# Patient Record
Sex: MALE | Race: WHITE | Employment: FULL TIME | URBAN - NONMETROPOLITAN AREA
[De-identification: names, ages, dates, MRNs, and addresses within clinical notes are randomized per-mention and may not be internally consistent; named-entity substitution may affect disease eponyms.]

---

## 2018-03-08 ENCOUNTER — OFFICE VISIT (OUTPATIENT)
Dept: FAMILY MEDICINE CLINIC | Age: 33
End: 2018-03-08
Payer: MEDICARE

## 2018-03-08 VITALS
WEIGHT: 172 LBS | HEIGHT: 72 IN | DIASTOLIC BLOOD PRESSURE: 78 MMHG | SYSTOLIC BLOOD PRESSURE: 132 MMHG | BODY MASS INDEX: 23.3 KG/M2

## 2018-03-08 DIAGNOSIS — S06.0X1D CONCUSSION WITH LOSS OF CONSCIOUSNESS OF 30 MINUTES OR LESS, SUBSEQUENT ENCOUNTER: ICD-10-CM

## 2018-03-08 DIAGNOSIS — R41.840 CONCENTRATION DEFICIT: ICD-10-CM

## 2018-03-08 DIAGNOSIS — M25.519 CHRONIC SHOULDER PAIN, UNSPECIFIED LATERALITY: Primary | ICD-10-CM

## 2018-03-08 DIAGNOSIS — Z72.0 TOBACCO USE: ICD-10-CM

## 2018-03-08 DIAGNOSIS — K08.9 DENTAL DISEASE: ICD-10-CM

## 2018-03-08 DIAGNOSIS — G89.29 CHRONIC SHOULDER PAIN, UNSPECIFIED LATERALITY: Primary | ICD-10-CM

## 2018-03-08 DIAGNOSIS — Z13.31 POSITIVE DEPRESSION SCREENING: ICD-10-CM

## 2018-03-08 DIAGNOSIS — Z79.899 MEDICATION MANAGEMENT: ICD-10-CM

## 2018-03-08 PROBLEM — S06.0X1A CONCUSSION WTH LOSS OF CONSCIOUSNESS OF 30 MINUTES OR LESS: Status: ACTIVE | Noted: 2018-03-08

## 2018-03-08 PROCEDURE — G8431 POS CLIN DEPRES SCRN F/U DOC: HCPCS | Performed by: FAMILY MEDICINE

## 2018-03-08 PROCEDURE — G8484 FLU IMMUNIZE NO ADMIN: HCPCS | Performed by: FAMILY MEDICINE

## 2018-03-08 PROCEDURE — 99205 OFFICE O/P NEW HI 60 MIN: CPT | Performed by: FAMILY MEDICINE

## 2018-03-08 PROCEDURE — G8427 DOCREV CUR MEDS BY ELIG CLIN: HCPCS | Performed by: FAMILY MEDICINE

## 2018-03-08 PROCEDURE — G8420 CALC BMI NORM PARAMETERS: HCPCS | Performed by: FAMILY MEDICINE

## 2018-03-08 PROCEDURE — 4004F PT TOBACCO SCREEN RCVD TLK: CPT | Performed by: FAMILY MEDICINE

## 2018-03-08 RX ORDER — BUPRENORPHINE HYDROCHLORIDE, NALOXONE HYDROCHLORIDE 8; 2 MG/1; MG/1
FILM, SOLUBLE BUCCAL; SUBLINGUAL
COMMUNITY
Start: 2018-02-27

## 2018-03-08 RX ORDER — CEPHALEXIN 500 MG/1
500 CAPSULE ORAL 2 TIMES DAILY
Qty: 20 CAPSULE | Refills: 0 | Status: SHIPPED | OUTPATIENT
Start: 2018-03-08 | End: 2019-10-21 | Stop reason: ALTCHOICE

## 2018-03-08 RX ORDER — SERTRALINE HYDROCHLORIDE 100 MG/1
200 TABLET, FILM COATED ORAL DAILY
COMMUNITY
Start: 2018-02-09 | End: 2019-10-21

## 2018-03-08 RX ORDER — MELOXICAM 15 MG/1
15 TABLET ORAL DAILY
Qty: 30 TABLET | Refills: 0 | Status: SHIPPED | OUTPATIENT
Start: 2018-03-08 | End: 2019-10-21 | Stop reason: ALTCHOICE

## 2018-03-08 ASSESSMENT — PATIENT HEALTH QUESTIONNAIRE - PHQ9
SUM OF ALL RESPONSES TO PHQ9 QUESTIONS 1 & 2: 6
4. FEELING TIRED OR HAVING LITTLE ENERGY: 2
9. THOUGHTS THAT YOU WOULD BE BETTER OFF DEAD, OR OF HURTING YOURSELF: 3
1. LITTLE INTEREST OR PLEASURE IN DOING THINGS: 3
6. FEELING BAD ABOUT YOURSELF - OR THAT YOU ARE A FAILURE OR HAVE LET YOURSELF OR YOUR FAMILY DOWN: 3
10. IF YOU CHECKED OFF ANY PROBLEMS, HOW DIFFICULT HAVE THESE PROBLEMS MADE IT FOR YOU TO DO YOUR WORK, TAKE CARE OF THINGS AT HOME, OR GET ALONG WITH OTHER PEOPLE: 3
SUM OF ALL RESPONSES TO PHQ QUESTIONS 1-9: 25
3. TROUBLE FALLING OR STAYING ASLEEP: 2
5. POOR APPETITE OR OVEREATING: 3
8. MOVING OR SPEAKING SO SLOWLY THAT OTHER PEOPLE COULD HAVE NOTICED. OR THE OPPOSITE, BEING SO FIGETY OR RESTLESS THAT YOU HAVE BEEN MOVING AROUND A LOT MORE THAN USUAL: 3
7. TROUBLE CONCENTRATING ON THINGS, SUCH AS READING THE NEWSPAPER OR WATCHING TELEVISION: 3
2. FEELING DOWN, DEPRESSED OR HOPELESS: 3

## 2018-03-08 ASSESSMENT — ENCOUNTER SYMPTOMS: BACK PAIN: 1

## 2018-03-08 NOTE — PATIENT INSTRUCTIONS
Need to see psychiatry for likely combination medication to control depression better. Reviewed need to see neurology for concussive symptoms.   Need to see dentist for repair and abscess control
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
## 2019-04-02 ENCOUNTER — TELEPHONE (OUTPATIENT)
Dept: FAMILY MEDICINE CLINIC | Age: 34
End: 2019-04-02

## 2019-04-02 NOTE — TELEPHONE ENCOUNTER
Contacting as pt noted on Ativan and xanax. Reported has history of seizures in the past and concern he will have them again. Previous dx of Anxiety/Depression treated with [facility]. Have given vistaryl 50 mg to take 3 times daily  Calling as informational note, pt has reported seeing me only 1 time for initial review. Has appt to see psychiatry planned though noting concern of care in the interim. I recommend if they are concerned with possible seizures to get referral to neurology. May also send for inpatient detox if that is what they feel is necessary for pt to get off the benzodiazepines. May review at follow up  [address] does not have any benzos listed on this pt.

## 2019-10-21 ENCOUNTER — OFFICE VISIT (OUTPATIENT)
Dept: FAMILY MEDICINE CLINIC | Age: 34
End: 2019-10-21
Payer: MEDICARE

## 2019-10-21 VITALS
TEMPERATURE: 99.5 F | SYSTOLIC BLOOD PRESSURE: 110 MMHG | HEART RATE: 89 BPM | HEIGHT: 72 IN | WEIGHT: 168 LBS | BODY MASS INDEX: 22.75 KG/M2 | DIASTOLIC BLOOD PRESSURE: 70 MMHG | OXYGEN SATURATION: 98 %

## 2019-10-21 DIAGNOSIS — F41.9 ANXIETY: ICD-10-CM

## 2019-10-21 DIAGNOSIS — R56.9 SEIZURE (HCC): Primary | ICD-10-CM

## 2019-10-21 DIAGNOSIS — Z72.0 TOBACCO USE: ICD-10-CM

## 2019-10-21 PROCEDURE — 4004F PT TOBACCO SCREEN RCVD TLK: CPT | Performed by: FAMILY MEDICINE

## 2019-10-21 PROCEDURE — 96160 PT-FOCUSED HLTH RISK ASSMT: CPT | Performed by: FAMILY MEDICINE

## 2019-10-21 PROCEDURE — G8427 DOCREV CUR MEDS BY ELIG CLIN: HCPCS | Performed by: FAMILY MEDICINE

## 2019-10-21 PROCEDURE — G8420 CALC BMI NORM PARAMETERS: HCPCS | Performed by: FAMILY MEDICINE

## 2019-10-21 PROCEDURE — 99213 OFFICE O/P EST LOW 20 MIN: CPT | Performed by: FAMILY MEDICINE

## 2019-10-21 PROCEDURE — G8484 FLU IMMUNIZE NO ADMIN: HCPCS | Performed by: FAMILY MEDICINE

## 2019-10-21 ASSESSMENT — COLUMBIA-SUICIDE SEVERITY RATING SCALE - C-SSRS
6. HAVE YOU EVER DONE ANYTHING, STARTED TO DO ANYTHING, OR PREPARED TO DO ANYTHING TO END YOUR LIFE?: NO
2. HAVE YOU ACTUALLY HAD ANY THOUGHTS OF KILLING YOURSELF?: YES
1. WITHIN THE PAST MONTH, HAVE YOU WISHED YOU WERE DEAD OR WISHED YOU COULD GO TO SLEEP AND NOT WAKE UP?: YES
4. HAVE YOU HAD THESE THOUGHTS AND HAD SOME INTENTION OF ACTING ON THEM?: NO
5. HAVE YOU STARTED TO WORK OUT OR WORKED OUT THE DETAILS OF HOW TO KILL YOURSELF? DO YOU INTEND TO CARRY OUT THIS PLAN?: NO
3. HAVE YOU BEEN THINKING ABOUT HOW YOU MIGHT KILL YOURSELF?: NO

## 2019-10-21 ASSESSMENT — ENCOUNTER SYMPTOMS
SINUS PAIN: 0
SHORTNESS OF BREATH: 1
COUGH: 1
ABDOMINAL PAIN: 0
CHEST TIGHTNESS: 1
VOMITING: 0
NAUSEA: 1
SINUS PRESSURE: 0
SORE THROAT: 1

## 2019-10-21 ASSESSMENT — PATIENT HEALTH QUESTIONNAIRE - PHQ9
8. MOVING OR SPEAKING SO SLOWLY THAT OTHER PEOPLE COULD HAVE NOTICED. OR THE OPPOSITE, BEING SO FIGETY OR RESTLESS THAT YOU HAVE BEEN MOVING AROUND A LOT MORE THAN USUAL: 0
SUM OF ALL RESPONSES TO PHQ QUESTIONS 1-9: 14
9. THOUGHTS THAT YOU WOULD BE BETTER OFF DEAD, OR OF HURTING YOURSELF: 1
3. TROUBLE FALLING OR STAYING ASLEEP: 3
10. IF YOU CHECKED OFF ANY PROBLEMS, HOW DIFFICULT HAVE THESE PROBLEMS MADE IT FOR YOU TO DO YOUR WORK, TAKE CARE OF THINGS AT HOME, OR GET ALONG WITH OTHER PEOPLE: 1
2. FEELING DOWN, DEPRESSED OR HOPELESS: 2
1. LITTLE INTEREST OR PLEASURE IN DOING THINGS: 1
4. FEELING TIRED OR HAVING LITTLE ENERGY: 1
6. FEELING BAD ABOUT YOURSELF - OR THAT YOU ARE A FAILURE OR HAVE LET YOURSELF OR YOUR FAMILY DOWN: 2
SUM OF ALL RESPONSES TO PHQ9 QUESTIONS 1 & 2: 3
SUM OF ALL RESPONSES TO PHQ QUESTIONS 1-9: 14
5. POOR APPETITE OR OVEREATING: 3
7. TROUBLE CONCENTRATING ON THINGS, SUCH AS READING THE NEWSPAPER OR WATCHING TELEVISION: 1

## 2019-10-23 ENCOUNTER — OFFICE VISIT (OUTPATIENT)
Dept: NEUROLOGY | Age: 34
End: 2019-10-23
Payer: MEDICARE

## 2019-10-23 VITALS
SYSTOLIC BLOOD PRESSURE: 124 MMHG | BODY MASS INDEX: 22.75 KG/M2 | WEIGHT: 168 LBS | HEART RATE: 61 BPM | DIASTOLIC BLOOD PRESSURE: 82 MMHG | HEIGHT: 72 IN

## 2019-10-23 DIAGNOSIS — G40.309 GENERALIZED SEIZURE DISORDER (HCC): ICD-10-CM

## 2019-10-23 DIAGNOSIS — G40.909 RECURRENT SEIZURES (HCC): ICD-10-CM

## 2019-10-23 DIAGNOSIS — F41.9 ANXIETY: ICD-10-CM

## 2019-10-23 DIAGNOSIS — R41.89 PARTIAL SEIZURE WITH IMPAIRED CONSCIOUSNESS (HCC): Primary | ICD-10-CM

## 2019-10-23 DIAGNOSIS — Z87.828 HISTORY OF HEAD INJURY: ICD-10-CM

## 2019-10-23 DIAGNOSIS — R56.9 PARTIAL SEIZURE WITH IMPAIRED CONSCIOUSNESS (HCC): Primary | ICD-10-CM

## 2019-10-23 DIAGNOSIS — F19.11 HISTORY OF SUBSTANCE ABUSE (HCC): ICD-10-CM

## 2019-10-23 PROCEDURE — 4004F PT TOBACCO SCREEN RCVD TLK: CPT | Performed by: PSYCHIATRY & NEUROLOGY

## 2019-10-23 PROCEDURE — G8427 DOCREV CUR MEDS BY ELIG CLIN: HCPCS | Performed by: PSYCHIATRY & NEUROLOGY

## 2019-10-23 PROCEDURE — G8420 CALC BMI NORM PARAMETERS: HCPCS | Performed by: PSYCHIATRY & NEUROLOGY

## 2019-10-23 PROCEDURE — G8484 FLU IMMUNIZE NO ADMIN: HCPCS | Performed by: PSYCHIATRY & NEUROLOGY

## 2019-10-23 PROCEDURE — 99204 OFFICE O/P NEW MOD 45 MIN: CPT | Performed by: PSYCHIATRY & NEUROLOGY

## 2019-10-23 RX ORDER — TOPIRAMATE 25 MG/1
TABLET ORAL
Qty: 70 TABLET | Refills: 0 | Status: ON HOLD | OUTPATIENT
Start: 2019-10-23 | End: 2020-01-25 | Stop reason: HOSPADM

## 2019-10-31 ENCOUNTER — HOSPITAL ENCOUNTER (OUTPATIENT)
Dept: MRI IMAGING | Age: 34
Discharge: HOME | End: 2019-11-02
Payer: MEDICARE

## 2019-10-31 DIAGNOSIS — Z87.828 HISTORY OF HEAD INJURY: ICD-10-CM

## 2019-10-31 DIAGNOSIS — G40.309 GENERALIZED SEIZURE DISORDER (HCC): ICD-10-CM

## 2019-10-31 DIAGNOSIS — R56.9 PARTIAL SEIZURE WITH IMPAIRED CONSCIOUSNESS (HCC): ICD-10-CM

## 2019-10-31 DIAGNOSIS — R41.89 PARTIAL SEIZURE WITH IMPAIRED CONSCIOUSNESS (HCC): ICD-10-CM

## 2019-10-31 LAB
BUN BLDV-MCNC: 14 MG/DL (ref 6–20)
CREAT SERPL-MCNC: 0.91 MG/DL (ref 0.7–1.2)
GFR AFRICAN AMERICAN: >60 ML/MIN
GFR NON-AFRICAN AMERICAN: >60 ML/MIN
GFR SERPL CREATININE-BSD FRML MDRD: NORMAL ML/MIN/{1.73_M2}
GFR SERPL CREATININE-BSD FRML MDRD: NORMAL ML/MIN/{1.73_M2}

## 2019-10-31 PROCEDURE — A9579 GAD-BASE MR CONTRAST NOS,1ML: HCPCS | Performed by: PSYCHIATRY & NEUROLOGY

## 2019-10-31 PROCEDURE — 70553 MRI BRAIN STEM W/O & W/DYE: CPT

## 2019-10-31 PROCEDURE — 36415 COLL VENOUS BLD VENIPUNCTURE: CPT

## 2019-10-31 PROCEDURE — 82565 ASSAY OF CREATININE: CPT

## 2019-10-31 PROCEDURE — 6360000004 HC RX CONTRAST MEDICATION: Performed by: PSYCHIATRY & NEUROLOGY

## 2019-10-31 PROCEDURE — 84520 ASSAY OF UREA NITROGEN: CPT

## 2019-10-31 RX ADMIN — GADOTERIDOL 15 ML: 279.3 INJECTION, SOLUTION INTRAVENOUS at 15:48

## 2019-11-13 ENCOUNTER — OFFICE VISIT (OUTPATIENT)
Dept: NEUROLOGY | Age: 34
End: 2019-11-13
Payer: MEDICARE

## 2019-11-13 ENCOUNTER — TELEPHONE (OUTPATIENT)
Dept: NEUROLOGY | Age: 34
End: 2019-11-13

## 2019-11-13 VITALS
SYSTOLIC BLOOD PRESSURE: 136 MMHG | BODY MASS INDEX: 23.62 KG/M2 | HEIGHT: 72 IN | HEART RATE: 84 BPM | WEIGHT: 174.4 LBS | DIASTOLIC BLOOD PRESSURE: 84 MMHG

## 2019-11-13 DIAGNOSIS — G40.909 RECURRENT SEIZURES (HCC): ICD-10-CM

## 2019-11-13 DIAGNOSIS — G40.309 GENERALIZED SEIZURE DISORDER (HCC): ICD-10-CM

## 2019-11-13 DIAGNOSIS — R56.9 PARTIAL SEIZURE WITH IMPAIRED CONSCIOUSNESS (HCC): Primary | ICD-10-CM

## 2019-11-13 DIAGNOSIS — F19.11 HISTORY OF SUBSTANCE ABUSE (HCC): ICD-10-CM

## 2019-11-13 DIAGNOSIS — Z87.828 HISTORY OF HEAD INJURY: ICD-10-CM

## 2019-11-13 DIAGNOSIS — R41.89 PARTIAL SEIZURE WITH IMPAIRED CONSCIOUSNESS (HCC): Primary | ICD-10-CM

## 2019-11-13 PROCEDURE — G8484 FLU IMMUNIZE NO ADMIN: HCPCS | Performed by: PSYCHIATRY & NEUROLOGY

## 2019-11-13 PROCEDURE — 4004F PT TOBACCO SCREEN RCVD TLK: CPT | Performed by: PSYCHIATRY & NEUROLOGY

## 2019-11-13 PROCEDURE — G8427 DOCREV CUR MEDS BY ELIG CLIN: HCPCS | Performed by: PSYCHIATRY & NEUROLOGY

## 2019-11-13 PROCEDURE — 99214 OFFICE O/P EST MOD 30 MIN: CPT | Performed by: PSYCHIATRY & NEUROLOGY

## 2019-11-13 PROCEDURE — G8420 CALC BMI NORM PARAMETERS: HCPCS | Performed by: PSYCHIATRY & NEUROLOGY

## 2019-11-13 RX ORDER — LACOSAMIDE 50 MG/1
TABLET ORAL
Qty: 120 TABLET | Refills: 0 | Status: ON HOLD | OUTPATIENT
Start: 2019-11-13 | End: 2020-01-25 | Stop reason: HOSPADM

## 2019-12-13 ENCOUNTER — TELEPHONE (OUTPATIENT)
Dept: NEUROLOGY | Age: 34
End: 2019-12-13

## 2020-01-21 ENCOUNTER — HOSPITAL ENCOUNTER (INPATIENT)
Age: 35
LOS: 4 days | Discharge: HOME | DRG: 053 | End: 2020-01-25
Attending: PSYCHIATRY & NEUROLOGY | Admitting: PSYCHIATRY & NEUROLOGY
Payer: MEDICARE

## 2020-01-21 ENCOUNTER — APPOINTMENT (OUTPATIENT)
Dept: GENERAL RADIOLOGY | Age: 35
DRG: 053 | End: 2020-01-21
Attending: PSYCHIATRY & NEUROLOGY
Payer: MEDICARE

## 2020-01-21 PROBLEM — G40.901 STATUS EPILEPTICUS (HCC): Status: ACTIVE | Noted: 2020-01-21

## 2020-01-21 LAB
-: ABNORMAL
ALLEN TEST: ABNORMAL
AMORPHOUS: ABNORMAL
AMPHETAMINE SCREEN URINE: NEGATIVE
BACTERIA: ABNORMAL
BARBITURATE SCREEN URINE: NEGATIVE
BENZODIAZEPINE SCREEN, URINE: POSITIVE
BILIRUBIN URINE: NEGATIVE
BUPRENORPHINE URINE: ABNORMAL
CANNABINOID SCREEN URINE: POSITIVE
CASTS UA: ABNORMAL /LPF (ref 0–8)
COCAINE METABOLITE, URINE: NEGATIVE
COLOR: YELLOW
COMMENT UA: ABNORMAL
CRYSTALS, UA: ABNORMAL /HPF
EPITHELIAL CELLS UA: ABNORMAL /HPF (ref 0–5)
FIO2: 30
GLUCOSE URINE: NEGATIVE
KETONES, URINE: NEGATIVE
LACTIC ACID, WHOLE BLOOD: 2.2 MMOL/L (ref 0.7–2.1)
LEUKOCYTE ESTERASE, URINE: ABNORMAL
MDMA URINE: ABNORMAL
METHADONE SCREEN, URINE: NEGATIVE
METHAMPHETAMINE, URINE: ABNORMAL
MODE: ABNORMAL
MUCUS: ABNORMAL
NEGATIVE BASE EXCESS, ART: ABNORMAL (ref 0–2)
NITRITE, URINE: NEGATIVE
O2 DEVICE/FLOW/%: ABNORMAL
OPIATES, URINE: NEGATIVE
OTHER OBSERVATIONS UA: ABNORMAL
OXYCODONE SCREEN URINE: NEGATIVE
PATIENT TEMP: ABNORMAL
PH UA: 6 (ref 5–8)
PHENCYCLIDINE, URINE: NEGATIVE
POC HCO3: 26.7 MMOL/L (ref 21–28)
POC O2 SATURATION: 71 % (ref 94–98)
POC PCO2 TEMP: ABNORMAL MM HG
POC PCO2: 50.2 MM HG (ref 35–48)
POC PH TEMP: ABNORMAL
POC PH: 7.33 (ref 7.35–7.45)
POC PO2 TEMP: ABNORMAL MM HG
POC PO2: 40.3 MM HG (ref 83–108)
POSITIVE BASE EXCESS, ART: 0 (ref 0–3)
PROCALCITONIN: 0.13 NG/ML
PROPOXYPHENE, URINE: ABNORMAL
PROTEIN UA: ABNORMAL
RBC UA: ABNORMAL /HPF (ref 0–4)
RENAL EPITHELIAL, UA: ABNORMAL /HPF
SAMPLE SITE: ABNORMAL
SPECIFIC GRAVITY UA: 1.01 (ref 1–1.03)
TCO2 (CALC), ART: 28 MMOL/L (ref 22–29)
TEST INFORMATION: ABNORMAL
TOTAL CK: 144 U/L (ref 39–308)
TRICHOMONAS: ABNORMAL
TRICYCLIC ANTIDEPRESSANTS, UR: ABNORMAL
TURBIDITY: ABNORMAL
URINE HGB: ABNORMAL
UROBILINOGEN, URINE: NORMAL
WBC UA: ABNORMAL /HPF (ref 0–5)
YEAST: ABNORMAL

## 2020-01-21 PROCEDURE — 6360000002 HC RX W HCPCS

## 2020-01-21 PROCEDURE — 2500000003 HC RX 250 WO HCPCS: Performed by: STUDENT IN AN ORGANIZED HEALTH CARE EDUCATION/TRAINING PROGRAM

## 2020-01-21 PROCEDURE — 81003 URINALYSIS AUTO W/O SCOPE: CPT

## 2020-01-21 PROCEDURE — 6360000002 HC RX W HCPCS: Performed by: STUDENT IN AN ORGANIZED HEALTH CARE EDUCATION/TRAINING PROGRAM

## 2020-01-21 PROCEDURE — 94770 HC ETCO2 MONITOR DAILY: CPT

## 2020-01-21 PROCEDURE — 87086 URINE CULTURE/COLONY COUNT: CPT

## 2020-01-21 PROCEDURE — 82803 BLOOD GASES ANY COMBINATION: CPT

## 2020-01-21 PROCEDURE — 81001 URINALYSIS AUTO W/SCOPE: CPT

## 2020-01-21 PROCEDURE — 51702 INSERT TEMP BLADDER CATH: CPT

## 2020-01-21 PROCEDURE — 71045 X-RAY EXAM CHEST 1 VIEW: CPT

## 2020-01-21 PROCEDURE — 2580000003 HC RX 258: Performed by: STUDENT IN AN ORGANIZED HEALTH CARE EDUCATION/TRAINING PROGRAM

## 2020-01-21 PROCEDURE — 94002 VENT MGMT INPAT INIT DAY: CPT

## 2020-01-21 PROCEDURE — 2700000000 HC OXYGEN THERAPY PER DAY

## 2020-01-21 PROCEDURE — 36415 COLL VENOUS BLD VENIPUNCTURE: CPT

## 2020-01-21 PROCEDURE — 95708 EEG WO VID EA 12-26HR UNMNTR: CPT

## 2020-01-21 PROCEDURE — 80307 DRUG TEST PRSMV CHEM ANLYZR: CPT

## 2020-01-21 PROCEDURE — 82550 ASSAY OF CK (CPK): CPT

## 2020-01-21 PROCEDURE — 87641 MR-STAPH DNA AMP PROBE: CPT

## 2020-01-21 PROCEDURE — 2000000003 HC NEURO ICU R&B

## 2020-01-21 PROCEDURE — 83605 ASSAY OF LACTIC ACID: CPT

## 2020-01-21 PROCEDURE — 36600 WITHDRAWAL OF ARTERIAL BLOOD: CPT

## 2020-01-21 PROCEDURE — 6370000000 HC RX 637 (ALT 250 FOR IP): Performed by: STUDENT IN AN ORGANIZED HEALTH CARE EDUCATION/TRAINING PROGRAM

## 2020-01-21 PROCEDURE — 94761 N-INVAS EAR/PLS OXIMETRY MLT: CPT

## 2020-01-21 PROCEDURE — C9254 INJECTION, LACOSAMIDE: HCPCS | Performed by: STUDENT IN AN ORGANIZED HEALTH CARE EDUCATION/TRAINING PROGRAM

## 2020-01-21 PROCEDURE — 5A1935Z RESPIRATORY VENTILATION, LESS THAN 24 CONSECUTIVE HOURS: ICD-10-PCS | Performed by: STUDENT IN AN ORGANIZED HEALTH CARE EDUCATION/TRAINING PROGRAM

## 2020-01-21 PROCEDURE — 84145 PROCALCITONIN (PCT): CPT

## 2020-01-21 RX ORDER — MIDAZOLAM HYDROCHLORIDE 1 MG/ML
2 INJECTION INTRAMUSCULAR; INTRAVENOUS EVERY 4 HOURS PRN
Status: DISCONTINUED | OUTPATIENT
Start: 2020-01-21 | End: 2020-01-21

## 2020-01-21 RX ORDER — MIDAZOLAM HYDROCHLORIDE 1 MG/ML
INJECTION INTRAMUSCULAR; INTRAVENOUS
Status: COMPLETED
Start: 2020-01-21 | End: 2020-01-21

## 2020-01-21 RX ORDER — PROPOFOL 10 MG/ML
INJECTION, EMULSION INTRAVENOUS
Status: COMPLETED
Start: 2020-01-21 | End: 2020-01-21

## 2020-01-21 RX ORDER — MIDAZOLAM HYDROCHLORIDE 1 MG/ML
4 INJECTION INTRAMUSCULAR; INTRAVENOUS ONCE
Status: COMPLETED | OUTPATIENT
Start: 2020-01-21 | End: 2020-01-21

## 2020-01-21 RX ORDER — PROPOFOL 10 MG/ML
10 INJECTION, EMULSION INTRAVENOUS
Status: DISCONTINUED | OUTPATIENT
Start: 2020-01-21 | End: 2020-01-23

## 2020-01-21 RX ORDER — SODIUM CHLORIDE 0.9 % (FLUSH) 0.9 %
10 SYRINGE (ML) INJECTION EVERY 12 HOURS SCHEDULED
Status: DISCONTINUED | OUTPATIENT
Start: 2020-01-21 | End: 2020-01-25 | Stop reason: HOSPADM

## 2020-01-21 RX ORDER — CHLORHEXIDINE GLUCONATE 0.12 MG/ML
15 RINSE ORAL 2 TIMES DAILY
Status: DISCONTINUED | OUTPATIENT
Start: 2020-01-21 | End: 2020-01-22

## 2020-01-21 RX ORDER — SODIUM CHLORIDE 0.9 % (FLUSH) 0.9 %
10 SYRINGE (ML) INJECTION PRN
Status: DISCONTINUED | OUTPATIENT
Start: 2020-01-21 | End: 2020-01-25 | Stop reason: HOSPADM

## 2020-01-21 RX ORDER — FENTANYL CITRATE 50 UG/ML
50 INJECTION, SOLUTION INTRAMUSCULAR; INTRAVENOUS EVERY 4 HOURS PRN
Status: DISCONTINUED | OUTPATIENT
Start: 2020-01-21 | End: 2020-01-21

## 2020-01-21 RX ORDER — MIDAZOLAM HYDROCHLORIDE 1 MG/ML
1 INJECTION INTRAMUSCULAR; INTRAVENOUS ONCE
Status: DISCONTINUED | OUTPATIENT
Start: 2020-01-21 | End: 2020-01-21

## 2020-01-21 RX ORDER — ONDANSETRON 2 MG/ML
4 INJECTION INTRAMUSCULAR; INTRAVENOUS EVERY 6 HOURS PRN
Status: DISCONTINUED | OUTPATIENT
Start: 2020-01-21 | End: 2020-01-25 | Stop reason: HOSPADM

## 2020-01-21 RX ADMIN — MIDAZOLAM HYDROCHLORIDE 4 MG: 1 INJECTION, SOLUTION INTRAMUSCULAR; INTRAVENOUS at 22:23

## 2020-01-21 RX ADMIN — SODIUM CHLORIDE: 9 INJECTION, SOLUTION INTRAVENOUS at 19:39

## 2020-01-21 RX ADMIN — PROPOFOL 50 MCG/KG/MIN: 10 INJECTION, EMULSION INTRAVENOUS at 22:36

## 2020-01-21 RX ADMIN — DEXTROSE MONOHYDRATE 100 MG: 50 INJECTION, SOLUTION INTRAVENOUS at 22:43

## 2020-01-21 RX ADMIN — MIDAZOLAM HYDROCHLORIDE 4 MG: 1 INJECTION, SOLUTION INTRAMUSCULAR; INTRAVENOUS at 18:27

## 2020-01-21 RX ADMIN — FAMOTIDINE 20 MG: 10 INJECTION, SOLUTION INTRAVENOUS at 19:55

## 2020-01-21 RX ADMIN — Medication 15 ML: at 19:52

## 2020-01-21 RX ADMIN — MIDAZOLAM HYDROCHLORIDE 4 MG: 1 INJECTION INTRAMUSCULAR; INTRAVENOUS at 18:27

## 2020-01-21 RX ADMIN — ENOXAPARIN SODIUM 40 MG: 40 INJECTION SUBCUTANEOUS at 19:55

## 2020-01-21 RX ADMIN — DEXTROSE MONOHYDRATE 200 MG: 50 INJECTION, SOLUTION INTRAVENOUS at 19:38

## 2020-01-21 RX ADMIN — FENTANYL CITRATE 50 MCG: 50 INJECTION, SOLUTION INTRAMUSCULAR; INTRAVENOUS at 20:05

## 2020-01-21 RX ADMIN — Medication 2 MG/HR: at 22:23

## 2020-01-21 ASSESSMENT — PULMONARY FUNCTION TESTS
PIF_VALUE: 16
PIF_VALUE: 12
PIF_VALUE: 9
PIF_VALUE: 10
PIF_VALUE: 11
PIF_VALUE: 13
PIF_VALUE: 10
PIF_VALUE: 13
PIF_VALUE: 16
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
## 2020-01-22 LAB
ABSOLUTE EOS #: <0.03 K/UL (ref 0–0.44)
ABSOLUTE IMMATURE GRANULOCYTE: 0.04 K/UL (ref 0–0.3)
ABSOLUTE LYMPH #: 1.75 K/UL (ref 1.1–3.7)
ABSOLUTE MONO #: 1.47 K/UL (ref 0.1–1.2)
ALLEN TEST: ABNORMAL
ANION GAP SERPL CALCULATED.3IONS-SCNC: 13 MMOL/L (ref 9–17)
ANION GAP SERPL CALCULATED.3IONS-SCNC: 14 MMOL/L (ref 9–17)
BASOPHILS # BLD: 0 % (ref 0–2)
BASOPHILS ABSOLUTE: <0.03 K/UL (ref 0–0.2)
BUN BLDV-MCNC: 16 MG/DL (ref 6–20)
BUN BLDV-MCNC: 16 MG/DL (ref 6–20)
BUN/CREAT BLD: ABNORMAL (ref 9–20)
BUN/CREAT BLD: ABNORMAL (ref 9–20)
CALCIUM SERPL-MCNC: 8.6 MG/DL (ref 8.6–10.4)
CALCIUM SERPL-MCNC: 9.2 MG/DL (ref 8.6–10.4)
CHLORIDE BLD-SCNC: 110 MMOL/L (ref 98–107)
CHLORIDE BLD-SCNC: 112 MMOL/L (ref 98–107)
CO2: 18 MMOL/L (ref 20–31)
CO2: 18 MMOL/L (ref 20–31)
CREAT SERPL-MCNC: 1.95 MG/DL (ref 0.7–1.2)
CREAT SERPL-MCNC: 2.49 MG/DL (ref 0.7–1.2)
CULTURE: NO GROWTH
DIFFERENTIAL TYPE: ABNORMAL
EOSINOPHILS RELATIVE PERCENT: 0 % (ref 1–4)
FIO2: 30
GFR AFRICAN AMERICAN: 36 ML/MIN
GFR AFRICAN AMERICAN: 48 ML/MIN
GFR NON-AFRICAN AMERICAN: 30 ML/MIN
GFR NON-AFRICAN AMERICAN: 40 ML/MIN
GFR SERPL CREATININE-BSD FRML MDRD: ABNORMAL ML/MIN/{1.73_M2}
GLUCOSE BLD-MCNC: 87 MG/DL (ref 70–99)
GLUCOSE BLD-MCNC: 90 MG/DL (ref 74–100)
GLUCOSE BLD-MCNC: 92 MG/DL (ref 70–99)
HCT VFR BLD CALC: 43 % (ref 40.7–50.3)
HEMOGLOBIN: 12.9 G/DL (ref 13–17)
IMMATURE GRANULOCYTES: 0 %
LACTIC ACID, WHOLE BLOOD: 0.8 MMOL/L (ref 0.7–2.1)
LYMPHOCYTES # BLD: 19 % (ref 24–43)
Lab: NORMAL
MAGNESIUM: 2.5 MG/DL (ref 1.6–2.6)
MCH RBC QN AUTO: 29.1 PG (ref 25.2–33.5)
MCHC RBC AUTO-ENTMCNC: 30 G/DL (ref 28.4–34.8)
MCV RBC AUTO: 96.8 FL (ref 82.6–102.9)
MODE: ABNORMAL
MONOCYTES # BLD: 16 % (ref 3–12)
MRSA, DNA, NASAL: NORMAL
MYOGLOBIN: 91 NG/ML (ref 28–72)
NEGATIVE BASE EXCESS, ART: 1 (ref 0–2)
NRBC AUTOMATED: 0 PER 100 WBC
O2 DEVICE/FLOW/%: ABNORMAL
PATIENT TEMP: ABNORMAL
PDW BLD-RTO: 13.7 % (ref 11.8–14.4)
PLATELET # BLD: 141 K/UL (ref 138–453)
PLATELET ESTIMATE: ABNORMAL
PMV BLD AUTO: 11.3 FL (ref 8.1–13.5)
POC HCO3: 24.8 MMOL/L (ref 21–28)
POC O2 SATURATION: 94 % (ref 94–98)
POC PCO2 TEMP: ABNORMAL MM HG
POC PCO2: 42.7 MM HG (ref 35–48)
POC PH TEMP: ABNORMAL
POC PH: 7.37 (ref 7.35–7.45)
POC PO2 TEMP: ABNORMAL MM HG
POC PO2: 74.6 MM HG (ref 83–108)
POSITIVE BASE EXCESS, ART: ABNORMAL (ref 0–3)
POTASSIUM SERPL-SCNC: 3.8 MMOL/L (ref 3.7–5.3)
POTASSIUM SERPL-SCNC: 4.1 MMOL/L (ref 3.7–5.3)
RBC # BLD: 4.44 M/UL (ref 4.21–5.77)
RBC # BLD: ABNORMAL 10*6/UL
SAMPLE SITE: ABNORMAL
SEG NEUTROPHILS: 65 % (ref 36–65)
SEGMENTED NEUTROPHILS ABSOLUTE COUNT: 6.18 K/UL (ref 1.5–8.1)
SODIUM BLD-SCNC: 141 MMOL/L (ref 135–144)
SODIUM BLD-SCNC: 144 MMOL/L (ref 135–144)
SPECIMEN DESCRIPTION: NORMAL
SPECIMEN DESCRIPTION: NORMAL
TCO2 (CALC), ART: 26 MMOL/L (ref 22–29)
TOTAL CK: 168 U/L (ref 39–308)
URIC ACID: 9.9 MG/DL (ref 3.4–7)
WBC # BLD: 9.5 K/UL (ref 3.5–11.3)
WBC # BLD: ABNORMAL 10*3/UL

## 2020-01-22 PROCEDURE — C9254 INJECTION, LACOSAMIDE: HCPCS | Performed by: STUDENT IN AN ORGANIZED HEALTH CARE EDUCATION/TRAINING PROGRAM

## 2020-01-22 PROCEDURE — 80048 BASIC METABOLIC PNL TOTAL CA: CPT

## 2020-01-22 PROCEDURE — 6360000002 HC RX W HCPCS

## 2020-01-22 PROCEDURE — 36600 WITHDRAWAL OF ARTERIAL BLOOD: CPT

## 2020-01-22 PROCEDURE — 84550 ASSAY OF BLOOD/URIC ACID: CPT

## 2020-01-22 PROCEDURE — 83874 ASSAY OF MYOGLOBIN: CPT

## 2020-01-22 PROCEDURE — 82550 ASSAY OF CK (CPK): CPT

## 2020-01-22 PROCEDURE — 82803 BLOOD GASES ANY COMBINATION: CPT

## 2020-01-22 PROCEDURE — 2580000003 HC RX 258: Performed by: STUDENT IN AN ORGANIZED HEALTH CARE EDUCATION/TRAINING PROGRAM

## 2020-01-22 PROCEDURE — 76937 US GUIDE VASCULAR ACCESS: CPT

## 2020-01-22 PROCEDURE — 99291 CRITICAL CARE FIRST HOUR: CPT | Performed by: PSYCHIATRY & NEUROLOGY

## 2020-01-22 PROCEDURE — 2500000003 HC RX 250 WO HCPCS: Performed by: STUDENT IN AN ORGANIZED HEALTH CARE EDUCATION/TRAINING PROGRAM

## 2020-01-22 PROCEDURE — 2700000000 HC OXYGEN THERAPY PER DAY

## 2020-01-22 PROCEDURE — 94003 VENT MGMT INPAT SUBQ DAY: CPT

## 2020-01-22 PROCEDURE — 95720 EEG PHY/QHP EA INCR W/VEEG: CPT | Performed by: PSYCHIATRY & NEUROLOGY

## 2020-01-22 PROCEDURE — 85025 COMPLETE CBC W/AUTO DIFF WBC: CPT

## 2020-01-22 PROCEDURE — 6370000000 HC RX 637 (ALT 250 FOR IP): Performed by: NURSE PRACTITIONER

## 2020-01-22 PROCEDURE — 95714 VEEG EA 12-26 HR UNMNTR: CPT

## 2020-01-22 PROCEDURE — 83735 ASSAY OF MAGNESIUM: CPT

## 2020-01-22 PROCEDURE — 6360000002 HC RX W HCPCS: Performed by: STUDENT IN AN ORGANIZED HEALTH CARE EDUCATION/TRAINING PROGRAM

## 2020-01-22 PROCEDURE — C1751 CATH, INF, PER/CENT/MIDLINE: HCPCS

## 2020-01-22 PROCEDURE — 36415 COLL VENOUS BLD VENIPUNCTURE: CPT

## 2020-01-22 PROCEDURE — 82947 ASSAY GLUCOSE BLOOD QUANT: CPT

## 2020-01-22 PROCEDURE — 83605 ASSAY OF LACTIC ACID: CPT

## 2020-01-22 PROCEDURE — 2000000003 HC NEURO ICU R&B

## 2020-01-22 RX ORDER — SENNA AND DOCUSATE SODIUM 50; 8.6 MG/1; MG/1
2 TABLET, FILM COATED ORAL DAILY
Status: DISCONTINUED | OUTPATIENT
Start: 2020-01-22 | End: 2020-01-25 | Stop reason: HOSPADM

## 2020-01-22 RX ORDER — FENTANYL CITRATE 50 UG/ML
50 INJECTION, SOLUTION INTRAMUSCULAR; INTRAVENOUS
Status: DISCONTINUED | OUTPATIENT
Start: 2020-01-22 | End: 2020-01-25 | Stop reason: HOSPADM

## 2020-01-22 RX ORDER — FENTANYL CITRATE 50 UG/ML
INJECTION, SOLUTION INTRAMUSCULAR; INTRAVENOUS
Status: COMPLETED
Start: 2020-01-22 | End: 2020-01-22

## 2020-01-22 RX ORDER — SODIUM CHLORIDE, SODIUM LACTATE, POTASSIUM CHLORIDE, AND CALCIUM CHLORIDE .6; .31; .03; .02 G/100ML; G/100ML; G/100ML; G/100ML
1000 INJECTION, SOLUTION INTRAVENOUS ONCE
Status: COMPLETED | OUTPATIENT
Start: 2020-01-22 | End: 2020-01-22

## 2020-01-22 RX ORDER — FENTANYL CITRATE 50 UG/ML
100 INJECTION, SOLUTION INTRAMUSCULAR; INTRAVENOUS ONCE
Status: COMPLETED | OUTPATIENT
Start: 2020-01-22 | End: 2020-01-22

## 2020-01-22 RX ORDER — SODIUM CHLORIDE, SODIUM LACTATE, POTASSIUM CHLORIDE, AND CALCIUM CHLORIDE .6; .31; .03; .02 G/100ML; G/100ML; G/100ML; G/100ML
500 INJECTION, SOLUTION INTRAVENOUS ONCE
Status: COMPLETED | OUTPATIENT
Start: 2020-01-22 | End: 2020-01-22

## 2020-01-22 RX ORDER — FENTANYL CITRATE 50 UG/ML
25 INJECTION, SOLUTION INTRAMUSCULAR; INTRAVENOUS
Status: DISCONTINUED | OUTPATIENT
Start: 2020-01-22 | End: 2020-01-25 | Stop reason: HOSPADM

## 2020-01-22 RX ORDER — SODIUM CHLORIDE, SODIUM LACTATE, POTASSIUM CHLORIDE, CALCIUM CHLORIDE 600; 310; 30; 20 MG/100ML; MG/100ML; MG/100ML; MG/100ML
INJECTION, SOLUTION INTRAVENOUS CONTINUOUS
Status: DISCONTINUED | OUTPATIENT
Start: 2020-01-22 | End: 2020-01-25

## 2020-01-22 RX ADMIN — FAMOTIDINE 20 MG: 10 INJECTION, SOLUTION INTRAVENOUS at 22:55

## 2020-01-22 RX ADMIN — SODIUM CHLORIDE, POTASSIUM CHLORIDE, SODIUM LACTATE AND CALCIUM CHLORIDE 1000 ML: 600; 310; 30; 20 INJECTION, SOLUTION INTRAVENOUS at 10:30

## 2020-01-22 RX ADMIN — FENTANYL CITRATE 100 MCG: 50 INJECTION, SOLUTION INTRAMUSCULAR; INTRAVENOUS at 01:08

## 2020-01-22 RX ADMIN — DEXTROSE MONOHYDRATE 100 MG: 50 INJECTION, SOLUTION INTRAVENOUS at 21:58

## 2020-01-22 RX ADMIN — FAMOTIDINE 20 MG: 10 INJECTION, SOLUTION INTRAVENOUS at 08:31

## 2020-01-22 RX ADMIN — DEXTROSE MONOHYDRATE 100 MG: 50 INJECTION, SOLUTION INTRAVENOUS at 08:31

## 2020-01-22 RX ADMIN — PROPOFOL 45 MCG/KG/MIN: 10 INJECTION, EMULSION INTRAVENOUS at 14:06

## 2020-01-22 RX ADMIN — SODIUM CHLORIDE, POTASSIUM CHLORIDE, SODIUM LACTATE AND CALCIUM CHLORIDE: 600; 310; 30; 20 INJECTION, SOLUTION INTRAVENOUS at 07:33

## 2020-01-22 RX ADMIN — DEXMEDETOMIDINE HYDROCHLORIDE 0.4 MCG/KG/HR: 100 INJECTION, SOLUTION INTRAVENOUS at 14:06

## 2020-01-22 RX ADMIN — PROPOFOL 50 MCG/KG/MIN: 10 INJECTION, EMULSION INTRAVENOUS at 09:05

## 2020-01-22 RX ADMIN — DOCUSATE SODIUM AND SENNOSIDES 2 TABLET: 50; 8.6 TABLET, FILM COATED ORAL at 10:30

## 2020-01-22 RX ADMIN — Medication 10 ML: at 08:32

## 2020-01-22 RX ADMIN — PROPOFOL 50 MCG/KG/MIN: 10 INJECTION, EMULSION INTRAVENOUS at 01:33

## 2020-01-22 RX ADMIN — SODIUM CHLORIDE, POTASSIUM CHLORIDE, SODIUM LACTATE AND CALCIUM CHLORIDE 500 ML: 600; 310; 30; 20 INJECTION, SOLUTION INTRAVENOUS at 09:02

## 2020-01-22 RX ADMIN — PROPOFOL 50 MCG/KG/MIN: 10 INJECTION, EMULSION INTRAVENOUS at 04:40

## 2020-01-22 RX ADMIN — SODIUM CHLORIDE, POTASSIUM CHLORIDE, SODIUM LACTATE AND CALCIUM CHLORIDE 1000 ML: 600; 310; 30; 20 INJECTION, SOLUTION INTRAVENOUS at 22:55

## 2020-01-22 RX ADMIN — ENOXAPARIN SODIUM 40 MG: 40 INJECTION SUBCUTANEOUS at 08:31

## 2020-01-22 ASSESSMENT — PULMONARY FUNCTION TESTS
PIF_VALUE: 13
PIF_VALUE: 16
PIF_VALUE: 13
PIF_VALUE: 17
PIF_VALUE: 16
PIF_VALUE: 19
PIF_VALUE: 17
PIF_VALUE: 16
PIF_VALUE: 13
PIF_VALUE: 17
PIF_VALUE: 17

## 2020-01-22 NOTE — PROGRESS NOTES
Patient has become much more arousable and awake during weaning of sedation and addition of precedex. Attempts to get out of bed and remove tube, but re-directs well. Not as agitated as earlier Will wean off ventilator soon.

## 2020-01-22 NOTE — PLAN OF CARE
DATE: 01/22/20       AWAKE & FOLLOWING COMMANDS:  [x] No   [] Yes     INTUBATED:   [] No   [x] Yes, SBT later today as weaning of drips progress for possible extubation. SEDATION/ANALGESIA:    [x] Propofol gtt  [] Precedex gtt [x] Versed gtt   [] No Sedation or Not Applicable  Pain medications: versed and prop gtt, fentanyl, will wean propofol and increase versed PRN.      VASOPRESSORS:  [x] No    [] Yes  [] Levophed [] Dopamine [] Vasopressin  [] Dobutamine [] Phenylephrine [] Epinephrine    CENTRAL LINES:  [x] No    [] Yes:  Location: , Date placed: , Indication:     NASH CATHETER: [] No    [x] Yes:  Location: Urethral, Date placed: 1/21/20, Indication:     FEEDING: Able to take PO?  [] No:  [] NG/OG [] PEG  [x] NPO for:   [] Tube Feeds    [] Yes:  Diet:     DVT Prophylaxis:  [x] Lovenox   [] Heparin subQ   [] Anticoagulation   [] Contraindicated secondary to:     Stress Ulcer Prophylaxis:  [] PPI  [x] H2 Receptor Blocker  [] Not indicated    Blood Glucose:  [x] Blood glucose <180 consistently  [] Insulin sliding scale   [x] Home Medications addressed     HOB >30 Degrees:  [x] Yes  [] No, contraindicated due to     Herb Pedersen MD, [facility]  PGY-2 Neurology   1/22/2020 at 9:23 AM

## 2020-01-22 NOTE — PROGRESS NOTES
Patient awakens through sedation occasionally, attempts to remove restraints and sits up in bed. Patient re-direction works well. Patient still on ventilator, restrained.

## 2020-01-22 NOTE — PROGRESS NOTES
Daily Progress Note  Neuro Critical Care    Patient Name: Ryland Boogie  Patient : 1985  Room/Bed: 5250/4042-17  Allergies: Allergies   Allergen Reactions    Flexeril [Cyclobenzaprine] Other (See Comments)     Restless legs, very axious    Amoxicillin Nausea And Vomiting    Penicillins Nausea And Vomiting     Problem List:   Patient Active Problem List   Diagnosis    Chronic shoulder pain    Tobacco use    Concussion wth loss of consciousness of 30 minutes or less    Dental disease    Anxiety    Partial seizure with impaired consciousness ([address]    Generalized seizure disorder ([address]    History of head injury    Status epilepticus ([address]       INTERVAL HISTORY    Initial Presentation   The patient is a 36 y.o. male transfer from [facility] presents for seizure. Patient is status post remote TBI, has had seizure since then. His neurologist has taken him off Relux 2 weeks ago due to suicidal ideation. Patient has been taking Xanax. There was concern as patient had a straw of some sort with dried powder in it, concerning for drugs. Patient has a history of drug addiction. Per report patient had fallen, was placed in c-collar, was intubated as he remained in status epilepticus for greater than 15 minutes. Last 24 hours:  Maintained on sedation with propofol and versed overnight. Some episodes of agitation while on sedation. No clinical or electrographic seizures were recorded.  Will wean off propofol and plan for SBT trial and possible extubation     MEDICATIONS:     CURRENT MEDICATIONS:  SCHEDULED MEDICATIONS:   famotidine (PEPCID) injection  20 mg Intravenous BID    lactated ringers bolus  500 mL Intravenous Once    sodium chloride flush  10 mL Intravenous 2 times per day    enoxaparin  40 mg Subcutaneous Daily    lacosamide (VIMPAT) IVPB  100 mg Intravenous BID     CONTINUOUS INFUSIONS:   lactated ringers 100 mL/hr at 20 4791    propofol 50 mcg/kg/min (20 0905)  midazolam 2 mg/hr (20)     PRN MEDICATIONS:   sodium chloride flush, magnesium hydroxide, ondansetron    VITALS 24 Hours     Temperature Range: Temp: 97.9 °F (36.6 °C) Temp  Av.8 °F (36.6 °C)  Min: 97.3 °F (36.3 °C)  Max: 98 °F (36.7 °C)  BP Range: Systolic (67WFW), GCN:674 , Min:108 , PCO:009     Diastolic (64KHR), ADQ:49, Min:65, Max:84    Pulse Range: Pulse  Av.3  Min: 68  Max: 78  Respiration Range: Resp  Av.6  Min: 14  Max: 18  Current Pulse Ox: SpO2: 99 %  24HR Pulse Ox Range: SpO2  Av.4 %  Min: 97 %  Max: 100 %  Patient Vitals for the past 12 hrs:   BP Temp Temp src Pulse Resp SpO2   20 0738 -- -- -- 68 16 99 %   20 0737 -- -- -- -- 15 100 %   20 0700 111/66 -- -- 69 17 99 %   20 0600 111/67 -- -- 71 17 100 %   20 0500 115/69 -- -- 75 18 99 %   20 0400 118/65 97.9 °F (36.6 °C) Oral 76 16 98 %   20 0300 120/74 -- -- 78 16 98 %   20 0200 116/71 -- -- 78 17 98 %   20 0000 123/75 98 °F (36.7 °C) Oral 74 17 100 %   20 2332 -- -- -- 74 17 100 %   20 2300 113/67 -- -- 76 17 97 %   20 2200 115/69 -- -- 75 16 100 %     Estimated body mass index is 21.89 kg/m² as calculated from the following:    Height as of this encounter: 6' (1.829 m). Weight as of this encounter: 161 lb 6 oz (73.2 kg). PHYSICAL EXAM     Physical Exam  Constitutional:       Appearance: He is normal weight. Interventions: He is sedated and intubated. Cervical collar in place. HENT:      Head: Normocephalic and atraumatic. Eyes:      General: Lids are normal.      Conjunctiva/sclera:      Right eye: Right conjunctiva is not injected. No chemosis, exudate or hemorrhage. Left eye: Left conjunctiva is not injected. No chemosis, exudate or hemorrhage. Pupils: Pupils are equal, round, and reactive to light. Cardiovascular:      Rate and Rhythm: Normal rate and regular rhythm. Pulses: Normal pulses.    Pulmonary: drug(s) and/or metabolite(s) may indicate diluted or adulterated urine, limitations of testing or timing of collection. Microscopic Urinalysis    Collection Time: 01/21/20  7:03 PM   Result Value Ref Range    -          WBC, UA 2 TO 5 0 - 5 /HPF    RBC, UA 2 TO 5 0 - 4 /HPF    Casts UA  0 - 8 /LPF     None Reference range defined for non-centrifuged specimen. Crystals UA MODERATE URIC ACID (A) None /HPF    Epithelial Cells UA 0 TO 2 0 - 5 /HPF    Renal Epithelial, Urine NOT REPORTED 0 /HPF    Bacteria, UA NOT REPORTED None    Mucus, UA NOT REPORTED None    Trichomonas, UA NOT REPORTED None    Amorphous, UA NOT REPORTED None    Other Observations UA NOT REPORTED NOT REQ.     Yeast, UA NOT REPORTED None   Lactic Acid, Whole Blood    Collection Time: 01/21/20  7:40 PM   Result Value Ref Range    Lactic Acid, Whole Blood 2.2 (H) 0.7 - 2.1 mmol/L   CBC auto differential    Collection Time: 01/22/20  4:36 AM   Result Value Ref Range    WBC 9.5 3.5 - 11.3 k/uL    RBC 4.44 4.21 - 5.77 m/uL    Hemoglobin 12.9 (L) 13.0 - 17.0 g/dL    Hematocrit 43.0 40.7 - 50.3 %    MCV 96.8 82.6 - 102.9 fL    MCH 29.1 25.2 - 33.5 pg    MCHC 30.0 28.4 - 34.8 g/dL    RDW 13.7 11.8 - 14.4 %    Platelets 414 518 - 654 k/uL    MPV 11.3 8.1 - 13.5 fL    NRBC Automated 0.0 0.0 per 100 WBC    Differential Type NOT REPORTED     Seg Neutrophils 65 36 - 65 %    Lymphocytes 19 (L) 24 - 43 %    Monocytes 16 (H) 3 - 12 %    Eosinophils % 0 (L) 1 - 4 %    Basophils 0 0 - 2 %    Immature Granulocytes 0 0 %    Segs Absolute 6.18 1.50 - 8.10 k/uL    Absolute Lymph # 1.75 1.10 - 3.70 k/uL    Absolute Mono # 1.47 (H) 0.10 - 1.20 k/uL    Absolute Eos # <0.03 0.00 - 0.44 k/uL    Basophils Absolute <0.03 0.00 - 0.20 k/uL    Absolute Immature Granulocyte 0.04 0.00 - 0.30 k/uL    WBC Morphology NOT REPORTED     RBC Morphology NOT REPORTED     Platelet Estimate NOT REPORTED    BASIC METABOLIC PANEL    Collection Time: 01/22/20  4:36 AM   Result Value Ref Range Glucose 92 70 - 99 mg/dL    BUN 16 6 - 20 mg/dL    CREATININE 1.95 (H) 0.70 - 1.20 mg/dL    Bun/Cre Ratio NOT REPORTED 9 - 20    Calcium 8.6 8.6 - 10.4 mg/dL    Sodium 141 135 - 144 mmol/L    Potassium 3.8 3.7 - 5.3 mmol/L    Chloride 110 (H) 98 - 107 mmol/L    CO2 18 (L) 20 - 31 mmol/L    Anion Gap 13 9 - 17 mmol/L    GFR Non-African American 40 (L) >60 mL/min    GFR  48 (L) >60 mL/min    GFR Comment          GFR Staging NOT REPORTED    Magnesium    Collection Time: 01/22/20  4:36 AM   Result Value Ref Range    Magnesium 2.5 1.6 - 2.6 mg/dL   Myoglobin, Serum    Collection Time: 01/22/20  4:36 AM   Result Value Ref Range    Myoglobin 91 (H) 28 - 72 ng/mL   Arterial Blood Gas, POC    Collection Time: 01/22/20  4:48 AM   Result Value Ref Range    POC pH 7.372 7.350 - 7.450    POC pCO2 42.7 35.0 - 48.0 mm Hg    POC PO2 74.6 (L) 83.0 - 108.0 mm Hg    POC HCO3 24.8 21.0 - 28.0 mmol/L    TCO2 (calc), Art 26 22.0 - 29.0 mmol/L    Negative Base Excess, Art 1 0.0 - 2.0    Positive Base Excess, Art NOT REPORTED 0.0 - 3.0    POC O2 SAT 94 94.0 - 98.0 %    O2 Device/Flow/% Adult Ventilator     Len Test NOT REPORTED     Sample Site Arterial Line     Mode PRVC     FIO2 30.0     Pt Temp NOT REPORTED     POC pH Temp NOT REPORTED     POC pCO2 Temp NOT REPORTED mm Hg    POC pO2 Temp NOT REPORTED mm Hg   POCT Glucose    Collection Time: 01/22/20  4:48 AM   Result Value Ref Range    POC Glucose 90 74 - 100 mg/dL   LACTIC ACID, WHOLE BLOOD    Collection Time: 01/22/20  8:13 AM   Result Value Ref Range    Lactic Acid, Whole Blood 0.8 0.7 - 2.1 mmol/L           RADIOLOGY   Xr Chest Portable    Result Date: 1/21/2020  EXAMINATION: ONE XRAY VIEW OF THE CHEST 1/21/2020 8:50 pm COMPARISON: January 21, 2020 HISTORY: ORDERING SYSTEM PROVIDED HISTORY: OG tube placement/ET tube placement verification TECHNOLOGIST PROVIDED HISTORY: OG tube placement/ET tube placement verification Reason for Exam: upright FINDINGS: ET tube 54 mm above the jennifer. Enteric tube is in the stomach. The lungs are without acute focal process. There is no effusion or pneumothorax. The cardiomediastinal silhouette is without acute process. The osseous structures are without acute process. No acute process. Satisfactory repositioning of the endotracheal and enteric tubes. Xr Chest Portable    Result Date: 1/21/2020  EXAMINATION: ONE XRAY VIEW OF THE CHEST 1/21/2020 6:40 pm COMPARISON: January 21, 2020 HISTORY: ORDERING SYSTEM PROVIDED HISTORY: seizure TECHNOLOGIST PROVIDED HISTORY: seizure Reason for Exam: upright FINDINGS: ET tube appears higher terminating 10 cm above the jennifer. New enteric tube is doubled back in the mid esophagus. Heart and mediastinum normal.  Lungs clear. Bony thorax intact. Malpositioned tubes as above. ET tube should be advanced 5-6 cm. Enteric tube should be removed and repositioned. RECOMMENDATION: The findings were sent to the Radiology Results [address] at 7:05 pm on 1/21/2020to be communicated to a licensed caregiver. Labs and Images reviewed with:  [x] Emiliana Blake    ASSESSMENT AND PLAN:       Status epilepticus, resolved   Acute kidney injury  Rhabdomiolysis   Metabolic acidosis   Lactic acidosis, resolved     NEURO  Continue vimpat 100 mg BID  Wean down propofol   Continue Versed  Start Precdex / Fentanyl    Continue LTME     CARDIOVASCULAR:    Vitals:    01/22/20 0600 01/22/20 0700 01/22/20 0737 01/22/20 0738   BP: 111/67 111/66     Pulse: 71 69  68   Resp: 17 17 15 16   Temp:       TempSrc:       SpO2: 100% 99% 100% 99%   Weight:       Height:       EKG: Normal sinus rhythm   Stable vital signs      PULM  PRVC 520, 12, 5, 30%  7.37, 42.4, 74, 18  Chest xray is clear  SBT trial and possible extubation     RENAL  Rhabdomyolysis   1.5 L of LR this morning followed by 150 cc/hour maintenance   Repeat CK isoenzymes and myoglobin    I/O last 3 completed shifts:   In: 8172 [I.V.:1403]  Out: 650 [Urine:650]  No intake/output data recorded. Intake/Output Summary (Last 24 hours) at 1/22/2020 0914  Last data filed at 1/22/2020 0700  Gross per 24 hour   Intake 1403 ml   Output 650 ml   Net 753 ml         INFECTIOUS DISEASE:  # No temperature spikes over the last 24 hours    GI/FEN/Nutrition:      Nutrition: Feeding: Other (Comment)(NPO)  Nutrition  Feeding: Other (Comment)(NPO):  GI Prophylaxis: Pepcid 20 mg IV BID    HEME    # H/H stable, no need for transfusion   #Hb trend was reviewed.  No concern for blood loss     ENDO   # Serum glucose is well controlled, no history of DM    Pain:      Lines, Drains, Airways   Midline, right cephalic   García's catheter   NG tube  ETT   2 peripheral IVs     PPX  # Pepcid 20 mg twice daily for GI prophylaxis   Lovenox 40 mg daily for DVT PPx     CODE  # Full code    Disposition:   Remain in the ICU      Man Cano MD  Neurology Resident  Neuro Critical Care   Phone: [address]

## 2020-01-22 NOTE — PLAN OF CARE
Problem: Restraint Use - Nonviolent/Non-Self-Destructive Behavior:  Goal: Absence of restraint-related injury  Description  Absence of restraint-related injury  1/22/2020 0429 by Merline Albee, RN  Outcome: Ongoing     Problem: Falls - Risk of:  Goal: Will remain free from falls  Description  Will remain free from falls  1/22/2020 0429 by Merline Albee, RN  Outcome: Ongoing     Problem: Skin Integrity:  Goal: Absence of new skin breakdown  Description  Absence of new skin breakdown  1/22/2020 0429 by Merline Albee, RN  Outcome: Ongoing

## 2020-01-22 NOTE — PROGRESS NOTES
Patient admitted on Mechanical Ventilator Protocol. Patients height measured 72\"  at  for an IBW 77.6    Patient placed on the ventilator on settings as charted on flowsheeet. Ventilator Bronchodilator assessment    Breath sounds: clear bilateral  Inspiratory Pressure: 14  Plateau Pressure: 12    Patient assessed at level 1      [x]    Bronchodilator Assessment    BRONCHODILATOR ASSESSMENT SCORE  Score 0 (Home) 1 2 3 4   Breath Sounds   []  Chronic Ventilator: Patient at baseline [x]  Mild Wheezes/ Clear []  Intermittent wheezes with good air entry []  Bilateral/unilateral wheezing with diminished air entry []  Insp/Exp wheeze and/or poor aeration   Ventilator Pressures   []  Chronic Ventilator [x]  Insp. Pressure less than 25 cm H20 []  Insp. Pressure less than 25 cm H20 []  Insp. Pressure exceeds 25 cm H20 []  Insp.  Pressure exceeds 30 cm H20   Plateau Pressure []  NA   [x]  Plateau Pressure less than 4  []  Plateau Pressure less than or equal to 5 []  Plateau Pressure greater than or equal to 6 []  Plateau Pressure greater than or equal to 8       Pooja Meyer  10:40 PM

## 2020-01-22 NOTE — PROGRESS NOTES
Patient alert and awake, assessed by Chela Agrawal NP. Extubated by RT with RN and NP present. Saturating well on the monitor, extubated to 2L via NC. Patient able to talk post-extubation. Restraints removed. Patient calm.

## 2020-01-22 NOTE — PLAN OF CARE
Problem: Restraint Use - Nonviolent/Non-Self-Destructive Behavior:  Goal: Absence of restraint-related injury  Description  Absence of restraint-related injury  Outcome: Ongoing  Patient's rights, safety, dignity and comfort maintained this shift. Continued need for restraints. Problem: Falls - Risk of:  Goal: Will remain free from falls  Description  Will remain free from falls  Outcome: Ongoing  Patient assessed for fall risk and fall precautions initiated as needed. Family instructed about safety devices and allowed to make decisions related to safey. Environment kept free of clutter and adequate lighting provided. Bed in lowest position with brakes locked. Patient ID band correct and in place. Patient transferred with appropriate methods. Will continue to monitor. Problem: Skin Integrity:  Goal: Absence of new skin breakdown  Description  Absence of new skin breakdown  Outcome: Ongoing  Skin assessed for breakdown and redness, patient turned regularly, and heels elevated off of bed on pillows. Will continue to monitor.

## 2020-01-22 NOTE — PROGRESS NOTES
Nutrition Assessment (Enteral Nutrition)    Type and Reason for Visit: Initial, Consult(Automatic vent consult - TF ordering/management)    Nutrition Recommendations:    - If nutrition support desired, suggest semi elemental at 50 mL/hr with current Propofol rate. - If Propofol able to be weaned off, suggest standard without fiber at 70 mL/hr (2016 kcals, 93 gm protein). Nutrition Assessment: Pt intubated. No family at bedside. Per EHR, pt has lost ~13 lbs over past 2 months. Malnutrition Assessment:  · Malnutrition Status: Insufficient data  · Findings of the 6 clinical characteristics of malnutrition (Minimum of 2 out of 6 clinical characteristics is required to make the diagnosis of moderate or severe Protein Calorie Malnutrition based on AND/ASPEN Guidelines):  1. Energy Intake-Unable to assess,      2. Weight Loss-7.5% loss or greater, (x 2 months)  3. Fat Loss-Unable to assess,    4. Muscle Loss-Unable to assess,    5. Fluid Accumulation-No significant fluid accumulation,    6.   Strength-Not measured    Nutrition Risk Level: High    Nutrition Needs:  · Estimated Daily Total Kcal: 25-28 kcal/kg/d = 6297-6370 kcals/day  · Estimated Daily Protein (g): 1.3-1.5 gm/kg/d =  gm/day    Nutrition Diagnosis:   · Problem: Inadequate oral intake  · Etiology: related to Impaired respiratory function-inability to consume food     Signs and symptoms:  as evidenced by NPO status due to medical condition    Objective Information:  · Wound Type: None  · Current Nutrition Therapies:  · Oral Diet Orders: NPO   · Additional Calories: Propofol at 22 mL/hr = ~581 kcals/day  · Anthropometric Measures:  · Ht: 6' (182.9 cm)   · Current Body Wt: 161 lb 6 oz (73.2 kg)  · Weight Change: 7.5% x 2 months per EHR   · Ideal Body Wt: 178 lb (80.7 kg), % Ideal Body 91%  · BMI Classification: BMI 18.5 - 24.9 Normal Weight    Nutrition Interventions:   Continue NPO(If nutrition support desired, suggest semi elemental

## 2020-01-22 NOTE — FLOWSHEET NOTE
[facility] radiology called, need to advance ET tube 5-6cm and reinsert OG tube. Dm Buchanan, RT notified of advancing ET tube.

## 2020-01-22 NOTE — PROCEDURES
[facility]        CONTINUOUS VIDEO EEG MONITORING           PATIENT: Luis Alberto Palomares  MRN #: [id number]  DATE: 1/21/2020 at 8:48 PM to 1/22/2020 at 6:16PM  REFERRING PHYSICIAN:  Maria Elena Bear DO     BRIEF HISTORY: Patient is a 36year old male with history of TBI, who presented with seizures in outside facility, LTME to evaluate. AEDs:  Vimpat 100mg BID; versed drip; propofol IV     EEG DESCRIPTION: 21 EEG electrodes were placed according to International 10/20 System. Single EKG electrode was also placed. Video recording was time-locked with EEG recording. BASELINE: Normal baseline was not obtainable. From the beginning of the recording, there was stage II sleep patterns, characterized by sleep spindles. No eye opening/closing artifacts. No posterior dominant rhythm. Spontaneous variability was and reactivity to stimulation were present. Hyperventilation and photic stimulation were not performed. Single lead EKG showed regular, heart rate at 70s per minute. Day 1 - 1/21/2020 at 8:48 PM to 1/22/2020 at 7:38AM-> 6:16PM  AEDs:  Vimpat 100mg BID; versed drip->off; propofol IV -> off  Interictal: Diffuse alpha or beta, or delta with superimposed alpha and beta, intermittently generalized decrement lasted 1-2 seconds. Possible left temporal intermittent slow, no epileptiform discharges. Sleep spindles were noted. At 4:45PM, patient was extubated. There was intermittent slow, generalized, in delta frequency lasted 1-2 seconds. Posterior dominant rhythm was 9Hz     Ictal:  None    Summary: During above recoding period, there was evidence of moderate to severe diffuse encephalopathy in the beginning of the recording but improved to mild diffuse encephalopathy at the end of the recording, no epileptiform discharges or EEG seizures were noted.      IMPRESSION  The patient underwent continuous video EEG monitoring from 1/21/2020 at 8:48

## 2020-01-22 NOTE — PLAN OF CARE
Problem: Skin Integrity:  Goal: Will show no infection signs and symptoms  Description  Will show no infection signs and symptoms  Outcome: Ongoing     Problem: Skin Integrity:  Goal: Absence of new skin breakdown  Description  Absence of new skin breakdown  1/21/2020 2024 by Nissa Baig RCP  Outcome: Ongoing     Problem: OXYGENATION/RESPIRATORY FUNCTION  Goal: Patient will maintain patent airway  Outcome: Ongoing     Problem: OXYGENATION/RESPIRATORY FUNCTION  Goal: Patient will achieve/maintain normal respiratory rate/effort  Description  Respiratory rate and effort will be within normal limits for the patient  Outcome: Ongoing     Problem: MECHANICAL VENTILATION  Goal: Patient will maintain patent airway  Outcome: Ongoing     Problem: MECHANICAL VENTILATION  Goal: Oral health is maintained or improved  Outcome: Ongoing     Problem: MECHANICAL VENTILATION  Goal: ET tube will be managed safely  Outcome: Ongoing     Problem: MECHANICAL VENTILATION  Goal: Ability to express needs and understand communication  Outcome: Ongoing     Problem: MECHANICAL VENTILATION  Goal: Mobility/activity is maintained at optimum level for patient  Outcome: Ongoing

## 2020-01-23 ENCOUNTER — APPOINTMENT (OUTPATIENT)
Dept: ULTRASOUND IMAGING | Age: 35
DRG: 053 | End: 2020-01-23
Attending: PSYCHIATRY & NEUROLOGY
Payer: MEDICARE

## 2020-01-23 LAB
-: NORMAL
ABSOLUTE EOS #: <0.03 K/UL (ref 0–0.44)
ABSOLUTE IMMATURE GRANULOCYTE: 0.03 K/UL (ref 0–0.3)
ABSOLUTE LYMPH #: 2.04 K/UL (ref 1.1–3.7)
ABSOLUTE MONO #: 1.43 K/UL (ref 0.1–1.2)
AMORPHOUS: NORMAL
ANION GAP SERPL CALCULATED.3IONS-SCNC: 15 MMOL/L (ref 9–17)
BACTERIA: NORMAL
BASOPHILS # BLD: 0 % (ref 0–2)
BASOPHILS ABSOLUTE: <0.03 K/UL (ref 0–0.2)
BILIRUBIN URINE: NEGATIVE
BUN BLDV-MCNC: 16 MG/DL (ref 6–20)
BUN/CREAT BLD: ABNORMAL (ref 9–20)
CALCIUM SERPL-MCNC: 8.5 MG/DL (ref 8.6–10.4)
CASTS UA: NORMAL /LPF (ref 0–8)
CHLORIDE BLD-SCNC: 108 MMOL/L (ref 98–107)
CO2: 20 MMOL/L (ref 20–31)
COLOR: YELLOW
COMMENT UA: ABNORMAL
CREAT SERPL-MCNC: 2.79 MG/DL (ref 0.7–1.2)
CREATININE URINE: 68.3 MG/DL (ref 39–259)
CRYSTALS, UA: NORMAL /HPF
DIFFERENTIAL TYPE: ABNORMAL
EOSINOPHIL,URINE: NORMAL
EOSINOPHILS RELATIVE PERCENT: 0 % (ref 1–4)
EPITHELIAL CELLS UA: NORMAL /HPF (ref 0–5)
GFR AFRICAN AMERICAN: 32 ML/MIN
GFR NON-AFRICAN AMERICAN: 26 ML/MIN
GFR SERPL CREATININE-BSD FRML MDRD: ABNORMAL ML/MIN/{1.73_M2}
GFR SERPL CREATININE-BSD FRML MDRD: ABNORMAL ML/MIN/{1.73_M2}
GLUCOSE BLD-MCNC: 84 MG/DL (ref 70–99)
GLUCOSE URINE: NEGATIVE
HCT VFR BLD CALC: 38.6 % (ref 40.7–50.3)
HEMOGLOBIN: 12.9 G/DL (ref 13–17)
IMMATURE GRANULOCYTES: 0 %
KETONES, URINE: NEGATIVE
LEUKOCYTE ESTERASE, URINE: ABNORMAL
LYMPHOCYTES # BLD: 22 % (ref 24–43)
MAGNESIUM: 2 MG/DL (ref 1.6–2.6)
MCH RBC QN AUTO: 31.2 PG (ref 25.2–33.5)
MCHC RBC AUTO-ENTMCNC: 33.4 G/DL (ref 28.4–34.8)
MCV RBC AUTO: 93.2 FL (ref 82.6–102.9)
MONOCYTES # BLD: 16 % (ref 3–12)
MUCUS: NORMAL
NITRITE, URINE: NEGATIVE
NRBC AUTOMATED: 0 PER 100 WBC
OTHER OBSERVATIONS UA: NORMAL
PDW BLD-RTO: 14.4 % (ref 11.8–14.4)
PH UA: 5 (ref 5–8)
PHOSPHORUS: 3.4 MG/DL (ref 2.5–4.5)
PLATELET # BLD: 105 K/UL (ref 138–453)
PLATELET ESTIMATE: ABNORMAL
PMV BLD AUTO: 12.1 FL (ref 8.1–13.5)
POTASSIUM SERPL-SCNC: 3.7 MMOL/L (ref 3.7–5.3)
POTASSIUM SERPL-SCNC: 3.8 MMOL/L (ref 3.7–5.3)
PROTEIN UA: ABNORMAL
RBC # BLD: 4.14 M/UL (ref 4.21–5.77)
RBC # BLD: ABNORMAL 10*6/UL
RBC UA: NORMAL /HPF (ref 0–4)
RENAL EPITHELIAL, UA: NORMAL /HPF
SEG NEUTROPHILS: 61 % (ref 36–65)
SEGMENTED NEUTROPHILS ABSOLUTE COUNT: 5.6 K/UL (ref 1.5–8.1)
SODIUM BLD-SCNC: 143 MMOL/L (ref 135–144)
SODIUM,UR: 43 MMOL/L
SPECIFIC GRAVITY UA: 1.01 (ref 1–1.03)
TOTAL CK: 270 U/L (ref 39–308)
TRICHOMONAS: NORMAL
TRIGL SERPL-MCNC: 86 MG/DL
TROPONIN INTERP: NORMAL
TROPONIN T: NORMAL NG/ML
TROPONIN, HIGH SENSITIVITY: 11 NG/L (ref 0–22)
TURBIDITY: CLEAR
URINE HGB: NEGATIVE
UROBILINOGEN, URINE: NORMAL
WBC # BLD: 9.1 K/UL (ref 3.5–11.3)
WBC # BLD: ABNORMAL 10*3/UL
WBC UA: NORMAL /HPF (ref 0–5)
YEAST: NORMAL

## 2020-01-23 PROCEDURE — C9254 INJECTION, LACOSAMIDE: HCPCS | Performed by: STUDENT IN AN ORGANIZED HEALTH CARE EDUCATION/TRAINING PROGRAM

## 2020-01-23 PROCEDURE — 84478 ASSAY OF TRIGLYCERIDES: CPT

## 2020-01-23 PROCEDURE — APPNB45 APP NON BILLABLE 31-45 MINUTES: Performed by: NURSE PRACTITIONER

## 2020-01-23 PROCEDURE — 85025 COMPLETE CBC W/AUTO DIFF WBC: CPT

## 2020-01-23 PROCEDURE — 87205 SMEAR GRAM STAIN: CPT

## 2020-01-23 PROCEDURE — 6360000002 HC RX W HCPCS: Performed by: STUDENT IN AN ORGANIZED HEALTH CARE EDUCATION/TRAINING PROGRAM

## 2020-01-23 PROCEDURE — 81001 URINALYSIS AUTO W/SCOPE: CPT

## 2020-01-23 PROCEDURE — 80048 BASIC METABOLIC PNL TOTAL CA: CPT

## 2020-01-23 PROCEDURE — 86038 ANTINUCLEAR ANTIBODIES: CPT

## 2020-01-23 PROCEDURE — 2060000000 HC ICU INTERMEDIATE R&B

## 2020-01-23 PROCEDURE — 82570 ASSAY OF URINE CREATININE: CPT

## 2020-01-23 PROCEDURE — 93005 ELECTROCARDIOGRAM TRACING: CPT | Performed by: STUDENT IN AN ORGANIZED HEALTH CARE EDUCATION/TRAINING PROGRAM

## 2020-01-23 PROCEDURE — 84484 ASSAY OF TROPONIN QUANT: CPT

## 2020-01-23 PROCEDURE — 36415 COLL VENOUS BLD VENIPUNCTURE: CPT

## 2020-01-23 PROCEDURE — 99222 1ST HOSP IP/OBS MODERATE 55: CPT | Performed by: STUDENT IN AN ORGANIZED HEALTH CARE EDUCATION/TRAINING PROGRAM

## 2020-01-23 PROCEDURE — 84300 ASSAY OF URINE SODIUM: CPT

## 2020-01-23 PROCEDURE — 6370000000 HC RX 637 (ALT 250 FOR IP): Performed by: STUDENT IN AN ORGANIZED HEALTH CARE EDUCATION/TRAINING PROGRAM

## 2020-01-23 PROCEDURE — 2580000003 HC RX 258: Performed by: STUDENT IN AN ORGANIZED HEALTH CARE EDUCATION/TRAINING PROGRAM

## 2020-01-23 PROCEDURE — 99233 SBSQ HOSP IP/OBS HIGH 50: CPT | Performed by: PSYCHIATRY & NEUROLOGY

## 2020-01-23 PROCEDURE — 84132 ASSAY OF SERUM POTASSIUM: CPT

## 2020-01-23 PROCEDURE — 97161 PT EVAL LOW COMPLEX 20 MIN: CPT

## 2020-01-23 PROCEDURE — 97116 GAIT TRAINING THERAPY: CPT

## 2020-01-23 PROCEDURE — 82550 ASSAY OF CK (CPK): CPT

## 2020-01-23 PROCEDURE — 80235 DRUG ASSAY LACOSAMIDE: CPT

## 2020-01-23 PROCEDURE — 93005 ELECTROCARDIOGRAM TRACING: CPT | Performed by: FAMILY MEDICINE

## 2020-01-23 PROCEDURE — 83735 ASSAY OF MAGNESIUM: CPT

## 2020-01-23 PROCEDURE — 84100 ASSAY OF PHOSPHORUS: CPT

## 2020-01-23 PROCEDURE — 97165 OT EVAL LOW COMPLEX 30 MIN: CPT

## 2020-01-23 PROCEDURE — 97535 SELF CARE MNGMENT TRAINING: CPT

## 2020-01-23 PROCEDURE — 76770 US EXAM ABDO BACK WALL COMP: CPT

## 2020-01-23 RX ORDER — ALPRAZOLAM 0.25 MG/1
0.5 TABLET ORAL 2 TIMES DAILY PRN
Status: DISCONTINUED | OUTPATIENT
Start: 2020-01-23 | End: 2020-01-25 | Stop reason: HOSPADM

## 2020-01-23 RX ORDER — ATROPINE SULFATE 0.1 MG/ML
0.5 INJECTION INTRAVENOUS
Status: DISPENSED | OUTPATIENT
Start: 2020-01-23 | End: 2020-01-23

## 2020-01-23 RX ORDER — HEPARIN SODIUM 5000 [USP'U]/ML
5000 INJECTION, SOLUTION INTRAVENOUS; SUBCUTANEOUS EVERY 8 HOURS SCHEDULED
Status: DISCONTINUED | OUTPATIENT
Start: 2020-01-23 | End: 2020-01-25 | Stop reason: HOSPADM

## 2020-01-23 RX ORDER — BUPRENORPHINE HYDROCHLORIDE AND NALOXONE HYDROCHLORIDE DIHYDRATE 8; 2 MG/1; MG/1
1 TABLET SUBLINGUAL DAILY
Status: DISCONTINUED | OUTPATIENT
Start: 2020-01-23 | End: 2020-01-25 | Stop reason: HOSPADM

## 2020-01-23 RX ORDER — NICOTINE 21 MG/24HR
1 PATCH, TRANSDERMAL 24 HOURS TRANSDERMAL DAILY
Status: DISCONTINUED | OUTPATIENT
Start: 2020-01-23 | End: 2020-01-25 | Stop reason: HOSPADM

## 2020-01-23 RX ORDER — NICOTINE 21 MG/24HR
1 PATCH, TRANSDERMAL 24 HOURS TRANSDERMAL DAILY
Status: DISCONTINUED | OUTPATIENT
Start: 2020-01-24 | End: 2020-01-23

## 2020-01-23 RX ADMIN — DEXTROSE MONOHYDRATE 100 MG: 50 INJECTION, SOLUTION INTRAVENOUS at 08:16

## 2020-01-23 RX ADMIN — HEPARIN SODIUM 5000 UNITS: 5000 INJECTION INTRAVENOUS; SUBCUTANEOUS at 13:51

## 2020-01-23 RX ADMIN — BUPRENORPHINE AND NALOXONE 1 TABLET: 8; 2 TABLET SUBLINGUAL at 00:42

## 2020-01-23 ASSESSMENT — PAIN SCALES - GENERAL
PAINLEVEL_OUTOF10: 6
PAINLEVEL_OUTOF10: 0

## 2020-01-23 ASSESSMENT — PAIN DESCRIPTION - DESCRIPTORS: DESCRIPTORS: DISCOMFORT

## 2020-01-23 ASSESSMENT — PAIN DESCRIPTION - LOCATION: LOCATION: BACK

## 2020-01-23 ASSESSMENT — PAIN DESCRIPTION - PAIN TYPE: TYPE: CHRONIC PAIN
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
## 2020-01-23 NOTE — PROGRESS NOTES
C- Spine Evaluation for Spine Clearance:    C-Spine precautions of C-collar with spinal neutrality maintained since arrival with current exam directed at further evaluation of spine for clearance purposes. Pt chart and current images reviewed. Patient does not have a distracting injury, is not acutely intoxicated and is alert, oriented and fully able to participate in exam.      Pt denies c-spine pain while resting in c-collar. C-collar removed w/ c-spine neutrality maintained. Pt denies midline pain with palpation of spinous processes and axial loading. Pt demonstrated full flexion, extension, and SB ROM without complaints of pain. C-spine is considered cleared w/out need for further imaging, evaluation, or continuation of c-collar. TLS considered clear w/out need for further imagine, evaluation, or continuation of supine bedrest precautions.     Electronically signed by Marla Guajardo DO on 1/23/2020 at 2:01 AM

## 2020-01-23 NOTE — PROGRESS NOTES
Daily Progress Note  Neuro Critical Care    Patient Name: Thom Salgado  Patient : 1985  Room/Bed: 9311/1898-40  Code Status: Full  Allergies: Allergies   Allergen Reactions    Flexeril [Cyclobenzaprine] Other (See Comments)     Restless legs, very axious    Amoxicillin Nausea And Vomiting    Penicillins Nausea And Vomiting       CHIEF COMPLAINT:      Seizure     INTERVAL HISTORY    Initial Presentation (Admitted 20): The patient is a 35 yo male with history of seizures and substance abuse who presented as a transfer from [facility] for status epilepticus. Patient has [facility] with [facility] outpatient, attempted several PO AEDs but did not tolerate them for various reasons. His current regimen per report was vimpat 100 mg BID and xanax. He was intubated at the outlying facility. CT head was negative for acute findings. Hospital Course:   : EEG without seizures, continue Vimpat 100 mg BID. Extubated to room air without difficulty. DOMENICA - fluid boluses followed by high rate maintenance fluids. Last 24h:   No acute events overnight. Renal function uptrending slightly. Renal ultrasound unremarkable. [facility] MRI brain w wo contrast from 10/2019 reviewed probable arachnoid cyst left quadrigeminal cistern - recommend a neurosurgery consult with outpatient follow up.      CURRENT MEDICATIONS:  SCHEDULED MEDICATIONS:   buprenorphine-naloxone  1 tablet Sublingual Daily    sennosides-docusate sodium  2 tablet Oral Daily    sodium chloride flush  10 mL Intravenous 2 times per day    enoxaparin  40 mg Subcutaneous Daily    lacosamide (VIMPAT) IVPB  100 mg Intravenous BID     CONTINUOUS INFUSIONS:   lactated ringers 150 mL/hr at 20 0958     PRN MEDICATIONS:   fentanNYL, fentanNYL, sodium chloride flush, magnesium hydroxide, ondansetron    VITALS:  Temperature Range: Temp: 99.3 °F (37.4 °C) Temp  Av.5 °F (36.9 °C) Min: 97.9 °F (36.6 °C)  Max: 99.3 °F (37.4 °C)  BP Range: Systolic (58ZNW), AN , Min:108 , AB     Diastolic (58AID), XEN:04, Min:66, Max:109    Pulse Range: Pulse  Av.3  Min: 47  Max: 79  Respiration Range: Resp  Av.3  Min: 13  Max: 22  Current Pulse Ox: SpO2: 97 %  24HR Pulse Ox Range: SpO2  Av.1 %  Min: 93 %  Max: 100 %  Patient Vitals for the past 12 hrs:   BP Temp Temp src Pulse Resp SpO2   20 0702 138/85 -- -- 65 19 97 %   20 0602 128/79 -- -- 62 19 96 %   20 0502 126/77 -- -- 54 13 95 %   20 0402 136/81 99.3 °F (37.4 °C) -- 79 19 96 %   20 0302 124/81 -- -- 56 20 93 %   20 0202 129/75 -- -- 62 20 96 %   20 0102 126/81 -- -- 58 22 97 %   20 0002 (!) 138/92 98.7 °F (37.1 °C) -- 60 17 98 %   209 -- 98.7 °F (37.1 °C) Oral -- -- --   20 230 (!) 143/83 -- -- 61 20 94 %   20 (!) 141/79 -- -- 60 19 98 %   202 137/79 -- -- 62 18 96 %   20 (!) 132/109 97.9 °F (36.6 °C) Oral 62 18 97 %     Estimated body mass index is 21.89 kg/m² as calculated from the following:    Height as of this encounter: 6' (1.829 m).     Weight as of this encounter: 161 lb 6 oz (73.2 kg).  []<16 Severe malnutrition  []16-16.99 Moderate malnutrition  []17-18.49 Mild malnutrition  [x]18.5-24.9 Normal  []25-29.9 Overweight (not obese)  []30-34.9 Obese class 1 (Low Risk)  []35-39.9 Obese class 2 (Moderate Risk)  []?40 Obese class 3 (High Risk)    RECENT LABS:   Lab Results   Component Value Date    WBC 9.1 2020    HGB 12.9 (L) 2020    HCT 38.6 (L) 2020     (L) 2020    TRIG 86 2020    ALT 8 2020    AST 12 2020     2020    K 3.8 2020     (H) 2020    CREATININE 2.79 (H) 2020    BUN 16 2020    CO2 20 2020    TSH 1.177 2020    INR 1.13 2020     24 HOUR INTAKE/OUTPUT:    Intake/Output Summary (Last 24 hours) at 2020 [address] [facility] filed at 1/23/2020 0540  Gross per 24 hour   Intake 6679 ml   Output 3125 ml   Net 3554 ml         Labs and Images reviewed with:  [x] Dr. Romaine Holter. Lou    [] Dr. Steve Rees  [] Dr. Nupur Francis  [] There are no new interval images to review. PHYSICAL EXAM       CONSTITUTIONAL:  Well developed, well nourished, alert and oriented x 3, in no acute distress. GCS 15. Nontoxic. No dysarthria. No aphasia. HEAD:  normocephalic, atraumatic    EYES:  PERRLA, EOMI.   ENT:  moist mucous membranes   NECK:  supple, symmetric   LUNGS:  Equal air entry bilaterally, clear   CARDIOVASCULAR:  normal s1 / s2, RRR, distal pulses intact   ABDOMEN:  Soft, no rigidity   NEUROLOGIC:  Mental Status:  A & O x3,awake             Cranial Nerves:    cranial nerves II-XII are grossly intact    Motor Exam:    Drift:  absent  Tone:  normal    Motor exam is symmetrical 5 out of 5 all extremities bilaterally    Sensory:    Touch:    Right Upper Extremity:  normal  Left Upper Extremity:  normal  Right Lower Extremity:  normal  Left Lower Extremity:  normal         DRAINS:  [x] There are no drains for Neuro Critical Care to monitor at this time. ASSESSMENT AND PLAN:       35 yo male with history of seizures, transfer from [facility] in Status epilepticus due to noncompliance. NEUROLOGIC:  - CT head unremarkable  - EEG: moderate to severe diffuse encephalopathy, no seizures.    - AEDs Vimpat 100 mg BID, Xanax 0.5 mg BID prn  - Recommend neurosurgery consult to evaluate arachnoid cyst  - Neuro checks per protocol    CARDIOVASCULAR:  - Goal normotension  - Continue telemetry    PULMONARY:  - Extubated 1/22   - Maintaining oxygen saturations    RENAL/FLUID/ELECTROLYTE:  - Acute kidney injury  - BUN 16/ Creatinine 2.79  - Good urine output: Urine output 1.7 ml/kg/hr  - IVF: Lactated ringers @ 150 mL/hr  - Renal ultrasound unremarkable  - Nephrology consult, appreciate recs  - Replace electrolytes PRN  - Daily BMP    GI/NUTRITION:  NUTRITION:  DIET GENERAL;  - Bowel regimen: senna-s  - GI prophylaxis: not indicated    ID:  - Tmax 37.4  - WBC 9.1  - Continue to monitor for fevers  - Daily CBC    HEME:   - H&H 12.9/38.6  - thrombocytopenia, Platelets 800  - Daily CBC    ENDOCRINE:  - Continue to monitor blood glucose, goal <180    OTHER:  - PT/OT/ST   - Code Status: Full    PROPHYLAXIS:  Stress ulcer: Not indicated    DVT PROPHYLAXIS:  - SCD sleeves - Thigh High   - Heparin 5000 units q8    DISPOSITION:  [x] OK for out of ICU from Neuro Critical Care standpoint - will sign out to neurology    For any changes in exam or patient status please contact Neuro Critical Care.       RENU Ceron - CNP  Neuro Critical Care  Pager [phone]  1/23/2020     7:43 AM
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
## 2020-01-23 NOTE — PROGRESS NOTES
Neuro Critical Care Sign Out to Neurology Resident      Date and time: 1/23/2020 4:37 PM  Patient's name:  Elton Sandoval  Medical Record Number: [id number]  Patient's account/billing number: [de-identified]  Patient's YOB: 1985  Age: 36 y.o. Date of Admission: 1/21/2020  5:45 PM  Length of stay during current admission: 2    Primary Care Physician: Sonya Knight MD    Code Status: Full Code    Mode of physician to physician communication:        [] Via telephone   [x] In person     Date and time of sign-out: 1/23/2020 4:37 PM    Accepting team's attending: Dr. Roula Kaur    Patient's current ICU Bed:  115     Patient's assigned bed on floor:  541        [] Med-Surg Monitored [x] Step-down         Reason for ICU admission:     Status epilepticus    ICU course summary:     The patient is a 37 yo male with history of seizures and substance abuse who presented as a transfer from [facility] for status epilepticus. Patient has followed with [facility] Neurology group outpatient, attempted several PO AEDs but did not tolerate them for various reasons. His current regimen per report was vimpat 100 mg BID and xanax. He was intubated at the outlying facility. CT head was negative for acute findings.      Hospital Course:   1/22: EEG without seizures, continue Vimpat 100 mg BID. Extubated to room air without difficulty. DOMENICA - fluid boluses followed by high rate maintenance fluids.     Last 24h:   No acute events overnight. Renal function uptrending slightly. Renal ultrasound unremarkable. Nephrology consult and transfer to [facility] with neurology. MRI brain w wo contrast from 10/2019 reviewed probable arachnoid cyst left quadrigeminal cistern - recommend a neurosurgery consult with outpatient follow up.      Procedures during patient's ICU stay:     Intubation    Current Vitals:     /87   Pulse 56   Temp 98.8 °F (37.1 °C) (Oral)   Resp 16   Ht 6' (1.829 m)   Wt 161 lb 6 oz (73.2 kg)   SpO2 97%   BMI 21.89 kg/m²       Cultures:     Blood cultures:                 [x] None drawn      [] Negative             []  Positive (Details:  )  Urine Culture:                   [] None drawn      [x] Negative             []  Positive (Details:  )  Sputum Culture:               [x] None drawn       [] Negative             []  Positive (Details:  )   Endotracheal aspirate:     [x] None drawn       [] Negative             []  Positive (Details:  )       Consults:     1. Nephrology  2. Recommend neurosurgery consult    Assessment:     Patient Active Problem List    Diagnosis Date Noted    On mechanically assisted ventilation ([facility] [address]     DOMENICA (acute kidney injury) ([facility] [address]     History of seizures     Encephalopathy     Status epilepticus ([facility] [address] 01/21/2020    Partial seizure with impaired consciousness ([facility] [address] 10/23/2019    Generalized seizure disorder ([facility] [address] 10/23/2019    History of head injury 10/23/2019    Anxiety 10/21/2019    Chronic shoulder pain 03/08/2018    Tobacco use 03/08/2018    Concussion wth loss of consciousness of 30 minutes or less 03/08/2018    Dental disease 03/08/2018       Recommended Follow-up:     1. Nephrology consult - follow up recs  2. Continue Vimpat 100 mg BID  3. Recommend neurosurgery consult 1/24        Above mentioned assessment and plan was discussed by me with the admitting medicine resident. The medicine team assigned to the patient by medicine admitting resident will be following up the patient from now onwards on the floor.      RENU Raines - CNP  Neuro Critical Care  1/23/2020, 4:37 PM
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
## 2020-01-24 LAB
ABSOLUTE EOS #: <0.03 K/UL (ref 0–0.44)
ABSOLUTE IMMATURE GRANULOCYTE: 0.03 K/UL (ref 0–0.3)
ABSOLUTE LYMPH #: 1.8 K/UL (ref 1.1–3.7)
ABSOLUTE MONO #: 0.99 K/UL (ref 0.1–1.2)
ANION GAP SERPL CALCULATED.3IONS-SCNC: 15 MMOL/L (ref 9–17)
ANTI-NUCLEAR ANTIBODY (ANA): NEGATIVE
BASOPHILS # BLD: 0 % (ref 0–2)
BASOPHILS ABSOLUTE: <0.03 K/UL (ref 0–0.2)
BUN BLDV-MCNC: 14 MG/DL (ref 6–20)
BUN/CREAT BLD: ABNORMAL (ref 9–20)
CALCIUM SERPL-MCNC: 9 MG/DL (ref 8.6–10.4)
CHLORIDE BLD-SCNC: 108 MMOL/L (ref 98–107)
CO2: 23 MMOL/L (ref 20–31)
CREAT SERPL-MCNC: 2.38 MG/DL (ref 0.7–1.2)
DIFFERENTIAL TYPE: ABNORMAL
EKG ATRIAL RATE: 40 BPM
EKG ATRIAL RATE: 43 BPM
EKG P AXIS: 46 DEGREES
EKG P AXIS: 66 DEGREES
EKG P-R INTERVAL: 116 MS
EKG P-R INTERVAL: 118 MS
EKG Q-T INTERVAL: 484 MS
EKG Q-T INTERVAL: 502 MS
EKG QRS DURATION: 66 MS
EKG QRS DURATION: 72 MS
EKG QTC CALCULATION (BAZETT): 408 MS
EKG QTC CALCULATION (BAZETT): 409 MS
EKG R AXIS: 49 DEGREES
EKG R AXIS: 55 DEGREES
EKG T AXIS: 48 DEGREES
EKG T AXIS: 56 DEGREES
EKG VENTRICULAR RATE: 40 BPM
EKG VENTRICULAR RATE: 43 BPM
EOSINOPHILS RELATIVE PERCENT: 0 % (ref 1–4)
GFR AFRICAN AMERICAN: 38 ML/MIN
GFR NON-AFRICAN AMERICAN: 32 ML/MIN
GFR SERPL CREATININE-BSD FRML MDRD: ABNORMAL ML/MIN/{1.73_M2}
GFR SERPL CREATININE-BSD FRML MDRD: ABNORMAL ML/MIN/{1.73_M2}
GLUCOSE BLD-MCNC: 115 MG/DL (ref 70–99)
HCT VFR BLD CALC: 39.9 % (ref 40.7–50.3)
HEMOGLOBIN: 12.7 G/DL (ref 13–17)
IMMATURE GRANULOCYTES: 0 %
LYMPHOCYTES # BLD: 23 % (ref 24–43)
MAGNESIUM: 1.7 MG/DL (ref 1.6–2.6)
MCH RBC QN AUTO: 28.7 PG (ref 25.2–33.5)
MCHC RBC AUTO-ENTMCNC: 31.8 G/DL (ref 28.4–34.8)
MCV RBC AUTO: 90.1 FL (ref 82.6–102.9)
MONOCYTES # BLD: 13 % (ref 3–12)
NRBC AUTOMATED: 0 PER 100 WBC
PDW BLD-RTO: 13.8 % (ref 11.8–14.4)
PHOSPHORUS: 3 MG/DL (ref 2.5–4.5)
PLATELET # BLD: 110 K/UL (ref 138–453)
PLATELET ESTIMATE: ABNORMAL
PMV BLD AUTO: 12.1 FL (ref 8.1–13.5)
POTASSIUM SERPL-SCNC: 3.8 MMOL/L (ref 3.7–5.3)
RBC # BLD: 4.43 M/UL (ref 4.21–5.77)
RBC # BLD: ABNORMAL 10*6/UL
SEG NEUTROPHILS: 64 % (ref 36–65)
SEGMENTED NEUTROPHILS ABSOLUTE COUNT: 5.02 K/UL (ref 1.5–8.1)
SODIUM BLD-SCNC: 146 MMOL/L (ref 135–144)
WBC # BLD: 7.9 K/UL (ref 3.5–11.3)
WBC # BLD: ABNORMAL 10*3/UL

## 2020-01-24 PROCEDURE — 80048 BASIC METABOLIC PNL TOTAL CA: CPT

## 2020-01-24 PROCEDURE — 36415 COLL VENOUS BLD VENIPUNCTURE: CPT

## 2020-01-24 PROCEDURE — 6360000002 HC RX W HCPCS: Performed by: STUDENT IN AN ORGANIZED HEALTH CARE EDUCATION/TRAINING PROGRAM

## 2020-01-24 PROCEDURE — 6370000000 HC RX 637 (ALT 250 FOR IP): Performed by: STUDENT IN AN ORGANIZED HEALTH CARE EDUCATION/TRAINING PROGRAM

## 2020-01-24 PROCEDURE — 83735 ASSAY OF MAGNESIUM: CPT

## 2020-01-24 PROCEDURE — 2500000003 HC RX 250 WO HCPCS: Performed by: STUDENT IN AN ORGANIZED HEALTH CARE EDUCATION/TRAINING PROGRAM

## 2020-01-24 PROCEDURE — 85025 COMPLETE CBC W/AUTO DIFF WBC: CPT

## 2020-01-24 PROCEDURE — 99233 SBSQ HOSP IP/OBS HIGH 50: CPT | Performed by: PSYCHIATRY & NEUROLOGY

## 2020-01-24 PROCEDURE — 2580000003 HC RX 258: Performed by: STUDENT IN AN ORGANIZED HEALTH CARE EDUCATION/TRAINING PROGRAM

## 2020-01-24 PROCEDURE — 2060000000 HC ICU INTERMEDIATE R&B

## 2020-01-24 PROCEDURE — 84100 ASSAY OF PHOSPHORUS: CPT

## 2020-01-24 PROCEDURE — 6360000002 HC RX W HCPCS

## 2020-01-24 PROCEDURE — 90792 PSYCH DIAG EVAL W/MED SRVCS: CPT | Performed by: NURSE PRACTITIONER

## 2020-01-24 RX ORDER — DOPAMINE HYDROCHLORIDE 160 MG/100ML
2.5 INJECTION, SOLUTION INTRAVENOUS CONTINUOUS
Status: DISCONTINUED | OUTPATIENT
Start: 2020-01-24 | End: 2020-01-25

## 2020-01-24 RX ORDER — SERTRALINE HYDROCHLORIDE 25 MG/1
25 TABLET, FILM COATED ORAL DAILY
Status: DISCONTINUED | OUTPATIENT
Start: 2020-01-24 | End: 2020-01-25 | Stop reason: HOSPADM

## 2020-01-24 RX ORDER — HYDROXYZINE HYDROCHLORIDE 25 MG/1
50 TABLET, FILM COATED ORAL NIGHTLY PRN
Status: DISCONTINUED | OUTPATIENT
Start: 2020-01-24 | End: 2020-01-25 | Stop reason: HOSPADM

## 2020-01-24 RX ORDER — HYDROXYZINE HYDROCHLORIDE 25 MG/1
25 TABLET, FILM COATED ORAL 2 TIMES DAILY PRN
Status: DISCONTINUED | OUTPATIENT
Start: 2020-01-24 | End: 2020-01-25 | Stop reason: HOSPADM

## 2020-01-24 RX ORDER — HYDRALAZINE HYDROCHLORIDE 20 MG/ML
5 INJECTION INTRAMUSCULAR; INTRAVENOUS ONCE
Status: DISCONTINUED | OUTPATIENT
Start: 2020-01-24 | End: 2020-01-24

## 2020-01-24 RX ORDER — HYDRALAZINE HYDROCHLORIDE 20 MG/ML
10 INJECTION INTRAMUSCULAR; INTRAVENOUS EVERY 6 HOURS PRN
Status: DISCONTINUED | OUTPATIENT
Start: 2020-01-24 | End: 2020-01-25 | Stop reason: HOSPADM

## 2020-01-24 RX ORDER — HYDRALAZINE HYDROCHLORIDE 20 MG/ML
INJECTION INTRAMUSCULAR; INTRAVENOUS
Status: COMPLETED
Start: 2020-01-24 | End: 2020-01-24

## 2020-01-24 RX ORDER — HYDRALAZINE HYDROCHLORIDE 20 MG/ML
10 INJECTION INTRAMUSCULAR; INTRAVENOUS ONCE
Status: COMPLETED | OUTPATIENT
Start: 2020-01-24 | End: 2020-01-24

## 2020-01-24 RX ORDER — CLONAZEPAM 1 MG/1
1 TABLET ORAL NIGHTLY
Status: DISCONTINUED | OUTPATIENT
Start: 2020-01-24 | End: 2020-01-25 | Stop reason: HOSPADM

## 2020-01-24 RX ORDER — LORAZEPAM 2 MG/ML
1 INJECTION INTRAMUSCULAR ONCE
Status: COMPLETED | OUTPATIENT
Start: 2020-01-24 | End: 2020-01-24

## 2020-01-24 RX ADMIN — HYDRALAZINE HYDROCHLORIDE 10 MG: 20 INJECTION INTRAMUSCULAR; INTRAVENOUS at 10:08

## 2020-01-24 RX ADMIN — HYDRALAZINE HYDROCHLORIDE 10 MG: 20 INJECTION INTRAMUSCULAR; INTRAVENOUS at 02:21

## 2020-01-24 RX ADMIN — HEPARIN SODIUM 5000 UNITS: 5000 INJECTION INTRAVENOUS; SUBCUTANEOUS at 17:33

## 2020-01-24 RX ADMIN — SERTRALINE 25 MG: 25 TABLET, FILM COATED ORAL at 17:49

## 2020-01-24 RX ADMIN — BUPRENORPHINE AND NALOXONE 1 TABLET: 8; 2 TABLET SUBLINGUAL at 08:27

## 2020-01-24 RX ADMIN — LORAZEPAM 1 MG: 2 INJECTION INTRAMUSCULAR; INTRAVENOUS at 17:12

## 2020-01-24 RX ADMIN — CLONAZEPAM 1 MG: 1 TABLET ORAL at 21:57

## 2020-01-24 RX ADMIN — Medication 10 ML: at 08:28

## 2020-01-24 RX ADMIN — HEPARIN SODIUM 5000 UNITS: 5000 INJECTION INTRAVENOUS; SUBCUTANEOUS at 08:28

## 2020-01-24 RX ADMIN — HEPARIN SODIUM 5000 UNITS: 5000 INJECTION INTRAVENOUS; SUBCUTANEOUS at 00:49

## 2020-01-24 RX ADMIN — VALPROATE SODIUM 1500 MG: 100 INJECTION, SOLUTION INTRAVENOUS at 00:59

## 2020-01-24 RX ADMIN — VALPROATE SODIUM 500 MG: 100 INJECTION, SOLUTION INTRAVENOUS at 15:54

## 2020-01-24 ASSESSMENT — PAIN SCALES - GENERAL
PAINLEVEL_OUTOF10: 0
PAINLEVEL_OUTOF10: 7
PAINLEVEL_OUTOF10: 0

## 2020-01-24 NOTE — CARE COORDINATION
Met with patient to discuss transition planning. Pt denies any needs for home care or SNF. Will need to follow up with [facility] to address mental health.  Social work consulted

## 2020-01-24 NOTE — PROGRESS NOTES
obese)  []30-34.9 Obese class 1 (Low Risk)  []35-39.9 Obese class 2 (Moderate Risk)  []?40 Obese class 3 (High Risk)    RECENT LABS:   Lab Results   Component Value Date    WBC 7.9 01/24/2020    HGB 12.7 (L) 01/24/2020    HCT 39.9 (L) 01/24/2020     (L) 01/24/2020    TRIG 86 01/23/2020    ALT 8 01/21/2020    AST 12 01/21/2020     (H) 01/24/2020    K 3.8 01/24/2020     (H) 01/24/2020    CREATININE 2.38 (H) 01/24/2020    BUN 14 01/24/2020    CO2 23 01/24/2020    TSH 1.177 01/21/2020    INR 1.13 01/21/2020     24 HOUR INTAKE/OUTPUT:    Intake/Output Summary (Last 24 hours) at 1/24/2020 1713  Last data filed at 1/24/2020 4145  Gross per 24 hour   Intake 3851 ml   Output 1350 ml   Net 2501 ml         Labs and Images reviewed with:  [x] Dr. Rosanna Diamond. Lou    [] Dr. Dionne Alves  [] Dr. Rosa Palomares  [] There are no new interval images to review. PHYSICAL EXAM       CONSTITUTIONAL:  Well developed, well nourished, alert and oriented x 3, in no acute distress. GCS 15. Nontoxic. No dysarthria. No aphasia. HEAD:  normocephalic, atraumatic    EYES:  PERRLA, EOMI.   ENT:  moist mucous membranes   NECK:  supple, symmetric   LUNGS:  Equal air entry bilaterally, clear   CARDIOVASCULAR:  normal s1 / s2, RRR, distal pulses intact   ABDOMEN:  Soft, no rigidity   NEUROLOGIC:  Mental Status:  A & O x3,awake             Cranial Nerves:    cranial nerves II-XII are grossly intact    Motor Exam:    Drift:  absent  Tone:  normal    Motor exam is symmetrical 5 out of 5 all extremities bilaterally    Sensory:    Touch:    Right Upper Extremity:  normal  Left Upper Extremity:  normal  Right Lower Extremity:  normal  Left Lower Extremity:  normal         DRAINS:  [x] There are no drains for Neuro Critical Care to monitor at this time. ASSESSMENT AND PLAN:       37 yo male with history of seizures, transfer from [facility] in Status epilepticus due to noncompliance.      NEUROLOGIC:  - CT head unremarkable  -

## 2020-01-24 NOTE — PROGRESS NOTES
The nurse perfect serve about bradycardia , heart rate in lower 40s  The patient is alert oriented x4, asymptomatic  We will get EKG, troponin, magnesium, phosphorus and potassium levels  The patient is on Vimpat which caused prolonged AZ interval, the patient has DOMENICA  We will follow    12:43 am  The patient will be transferred to the neuro ICU for monitoring  The patient be started on low-dose infusion of dopamine per cardiology recommendation,   Will hold on night dose of Vimpat as a known cause of arrhythmia/bradycardia  Will load the patient with Depacon 1.5 g  We will follow    Discussed with Dr. Alyssa Henderson. Janice Vásquez MD  PGY-2, Neurology resident  [facility] [place] [facility].  7:02 PM 1/23/2020

## 2020-01-24 NOTE — PROGRESS NOTES
Nephrology Progress Note      SUBJECTIVE       Pt was seen and examined. Had some bradycardia, asymptomatic last night. Stable hemodynamics this morning. Serum creatinine is down to 2.3 today improving  His creatinine did peak as high as 2.79 yesterday. Electrolytes this morning look okay. Patient shared her concerns with me regarding his insight into his addiction. He is very apprehensive going back to the same environment he left. He was asking for help in regards to his drug habits. I did tell him that we could get psych involved. OBJECTIVE      CURRENT TEMPERATURE:  Temp: 98.7 °F (37.1 °C)  MAXIMUM TEMPERATURE OVER 24HRS:  Temp (24hrs), Av.2 °F (36.8 °C), Min:97.6 °F (36.4 °C), Max:98.8 °F (37.1 °C)    CURRENT RESPIRATORY RATE:  Resp: 15  CURRENT PULSE:  Pulse: (!) 46  CURRENT BLOOD PRESSURE:  BP: 137/85  24HR BLOOD PRESSURE RANGE:  Systolic (36PUN), XSE:374 , Min:130 , WEM:591   ; Diastolic (12DCV), MUH:32, Min:76, Max:92    24HR INTAKE/OUTPUT:      Intake/Output Summary (Last 24 hours) at 2020 1122  Last data filed at 2020 1505  Gross per 24 hour   Intake 4371 ml   Output 2275 ml   Net 2096 ml     WEIGHT :  Patient Vitals for the past 96 hrs (Last 3 readings):   Weight   20 0600 177 lb 0.5 oz (80.3 kg)   20 1800 161 lb 6 oz (73.2 kg)     PHYSICAL EXAM      GENERAL APPEARANCE:Awake, alert, in no acute distress  SKIN: warm and dry, no rash or erythema  EYES: conjunctivae normal and sclera anicteric  ENT: no thrush no pharyngeal congestion   NECK:   No JVD. No carotid bruits and no carotid lymphadenopathy . PULMONARY: lungs are clear to auscultation. No Wheezing, no ronchi . CADRDIOVASCULAR: S1 and S2 normal NO S3 and NO S4 . No rubs , no murmur. ABDOMEN: soft nontender, bowel sounds present, no organomegaly, no ascites.      EXTREMITIES: no cyanosis, clubbing or edema     CURRENT MEDICATIONS      DOPamine (INTROPIN) 400 mg in dextrose 5 % 250 mL infusion, EOSINOPHILS:   Lab Results   Component Value Date    UREO NONE SEEN 01/23/2020     URINALYSIS:  U/A:   Lab Results   Component Value Date    NITRU NEGATIVE 01/23/2020    COLORU YELLOW 01/23/2020    PHUR 5.0 01/23/2020    WBCUA 5 TO 10 01/23/2020    WBCUA OCCASIONAL 01/21/2020    RBCUA 0 TO 2 01/23/2020    MUCUS NOT REPORTED 01/23/2020    TRICHOMONAS NOT REPORTED 01/23/2020    YEAST NOT REPORTED 01/23/2020    BACTERIA NOT REPORTED 01/23/2020    SPECGRAV 1.008 01/23/2020    LEUKOCYTESUR TRACE 01/23/2020    UROBILINOGEN Normal 01/23/2020    BILIRUBINUR NEGATIVE 01/23/2020    BILIRUBINUR NEGATIVE 01/21/2020    GLUCOSEU NEGATIVE 01/23/2020    KETUA NEGATIVE 01/23/2020    AMORPHOUS NOT REPORTED 01/23/2020     Renal ultrasound is without hydronephrosis, normal size kidneys. No mention of any cysts on either kidney. Patient was concerned about this which as his father is known to have polycystic kidney disease. ASSESSMENT      1. DOMENICA : Most likely secondary to mild ATN which seems to be improving now. 2. HTN: Stable  3. Status epilepticus  4. History of drug use, appears more depressed as well    PLAN      1. Please consider psych evaluation   2. May decrease IV fluids to 100 mL an hour, encourage oral intake  3. Follow up labs ordered. 4. Following along       Please do not hesitate to call with questions.     Electronically signed by Mirela Tuttle MD on 1/24/2020 at 11:22 AM

## 2020-01-24 NOTE — CONSULTS
2 tab twice daily 11/13/19 11/13/20  Aguila Manzo MD   topiramate (TOPAMAX) 25 MG tablet WK 1 :  1TAB  PM., WK 2 :  1TAB BID; WK 3 :  1 TAB AM - 2 TAB  PM., WK 4 :  2 TAB BID 10/23/19   Aguila Manzo MD   SUBOXONE 8-2 MG FILM SL film  2/27/18   Historical Provider, MD     valproate sodium (DEPACON) IVPB, 500 mg, Intravenous, Q12H    buprenorphine-naloxone, 1 tablet, Sublingual, Daily    heparin (porcine), 5,000 Units, Subcutaneous, 3 times per day    nicotine, 1 patch, Transdermal, Daily    sennosides-docusate sodium, 2 tablet, Oral, Daily    sodium chloride flush, 10 mL, Intravenous, 2 times per day    [Held by provider] lacosamide (VIMPAT) IVPB, 100 mg, Intravenous, BID      Allergies:  Flexeril [cyclobenzaprine]; Amoxicillin; and Penicillins    Social History:   reports that he has been smoking cigarettes. He started smoking about 14 years ago. He has been smoking about 1.50 packs per day. He has never used smokeless tobacco. He reports current drug use. Drug: Marijuana. He reports that he does not drink alcohol. Family History: family history includes Cancer in his paternal grandfather; Heart Disease in his father, maternal grandmother, and paternal grandmother; High Blood Pressure in his father and paternal grandfather; Kidney Disease in his father; Obesity in his paternal grandmother; Other in his maternal grandmother, mother, and paternal grandmother. No h/o sudden cardiac death. REVIEW OF SYSTEMS:    · Constitutional: there has been no unanticipated weight loss. There's been No change in energy level, No change in activity level. · Eyes: No visual changes or diplopia. No scleral icterus. · ENT: No Headaches, hearing loss or vertigo. No mouth sores or sore throat. · Cardiovascular: No CP, SOB  · Respiratory: No previous pulmonary problems  · Gastrointestinal: No abdominal pain, appetite loss, blood in stools. No change in bowel or bladder habits.   · Genitourinary: No With changes made to the note.      Electronically signed by Deep Bhatti MD on 1/24/2020 at 6:57 PM.    [facility]      [phone]
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
## 2020-01-24 NOTE — PROGRESS NOTES
Pt verbalized to Dr Jerrod Ochoa that he was worried about harming himself once he returns home. Dr Jerrod Ochoa brought this to the nurses attention and stated he may benefit from a psych consult. After further questioning, the patient told the nurse that he suffers from bad anxiety. Stated that he takes suboxone and ativan and xanax at home to [employer]" his anxiety. Denied having any suicidal ideation. States that he thinks ativan and xanax really help him stay focused and also get solid sleep. Stated he would rather just have a script then try to self medicate.  Electronically signed by Nancy Ryan RN on 1/24/2020 at 2:47 PM

## 2020-01-24 NOTE — PROGRESS NOTES
Dopamine on hold per ,  IV hydralazine given per order.  Pt remains asymptomatic, HR 50-60s at rest. Will cont to monitor

## 2020-01-25 VITALS
RESPIRATION RATE: 16 BRPM | OXYGEN SATURATION: 98 % | SYSTOLIC BLOOD PRESSURE: 135 MMHG | TEMPERATURE: 98.1 F | BODY MASS INDEX: 23.98 KG/M2 | HEIGHT: 72 IN | HEART RATE: 49 BPM | DIASTOLIC BLOOD PRESSURE: 90 MMHG | WEIGHT: 177.03 LBS

## 2020-01-25 LAB
ABSOLUTE EOS #: 0.05 K/UL (ref 0–0.44)
ABSOLUTE IMMATURE GRANULOCYTE: 0.03 K/UL (ref 0–0.3)
ABSOLUTE LYMPH #: 2.35 K/UL (ref 1.1–3.7)
ABSOLUTE MONO #: 0.99 K/UL (ref 0.1–1.2)
ANION GAP SERPL CALCULATED.3IONS-SCNC: 12 MMOL/L (ref 9–17)
BASOPHILS # BLD: 0 % (ref 0–2)
BASOPHILS ABSOLUTE: 0.03 K/UL (ref 0–0.2)
BUN BLDV-MCNC: 12 MG/DL (ref 6–20)
BUN/CREAT BLD: ABNORMAL (ref 9–20)
CALCIUM SERPL-MCNC: 8.5 MG/DL (ref 8.6–10.4)
CHLORIDE BLD-SCNC: 107 MMOL/L (ref 98–107)
CO2: 25 MMOL/L (ref 20–31)
CREAT SERPL-MCNC: 1.58 MG/DL (ref 0.7–1.2)
DIFFERENTIAL TYPE: ABNORMAL
EOSINOPHILS RELATIVE PERCENT: 1 % (ref 1–4)
GFR AFRICAN AMERICAN: >60 ML/MIN
GFR NON-AFRICAN AMERICAN: 51 ML/MIN
GFR SERPL CREATININE-BSD FRML MDRD: ABNORMAL ML/MIN/{1.73_M2}
GFR SERPL CREATININE-BSD FRML MDRD: ABNORMAL ML/MIN/{1.73_M2}
GLUCOSE BLD-MCNC: 94 MG/DL (ref 70–99)
HCT VFR BLD CALC: 37.2 % (ref 40.7–50.3)
HEMOGLOBIN: 12.6 G/DL (ref 13–17)
IMMATURE GRANULOCYTES: 0 %
LACOSAMIDE: 2.7 UG/ML (ref 5–10)
LV EF: 68 %
LVEF MODALITY: NORMAL
LYMPHOCYTES # BLD: 27 % (ref 24–43)
MCH RBC QN AUTO: 30.9 PG (ref 25.2–33.5)
MCHC RBC AUTO-ENTMCNC: 33.9 G/DL (ref 28.4–34.8)
MCV RBC AUTO: 91.2 FL (ref 82.6–102.9)
MONOCYTES # BLD: 11 % (ref 3–12)
NRBC AUTOMATED: 0 PER 100 WBC
PDW BLD-RTO: 14 % (ref 11.8–14.4)
PLATELET # BLD: 116 K/UL (ref 138–453)
PLATELET ESTIMATE: ABNORMAL
PMV BLD AUTO: 12.3 FL (ref 8.1–13.5)
POTASSIUM SERPL-SCNC: 3.3 MMOL/L (ref 3.7–5.3)
RBC # BLD: 4.08 M/UL (ref 4.21–5.77)
RBC # BLD: ABNORMAL 10*6/UL
SEG NEUTROPHILS: 61 % (ref 36–65)
SEGMENTED NEUTROPHILS ABSOLUTE COUNT: 5.41 K/UL (ref 1.5–8.1)
SODIUM BLD-SCNC: 144 MMOL/L (ref 135–144)
WBC # BLD: 8.9 K/UL (ref 3.5–11.3)
WBC # BLD: ABNORMAL 10*3/UL

## 2020-01-25 PROCEDURE — 6370000000 HC RX 637 (ALT 250 FOR IP): Performed by: STUDENT IN AN ORGANIZED HEALTH CARE EDUCATION/TRAINING PROGRAM

## 2020-01-25 PROCEDURE — 2580000003 HC RX 258: Performed by: STUDENT IN AN ORGANIZED HEALTH CARE EDUCATION/TRAINING PROGRAM

## 2020-01-25 PROCEDURE — 99239 HOSP IP/OBS DSCHRG MGMT >30: CPT | Performed by: PSYCHIATRY & NEUROLOGY

## 2020-01-25 PROCEDURE — 6360000002 HC RX W HCPCS: Performed by: STUDENT IN AN ORGANIZED HEALTH CARE EDUCATION/TRAINING PROGRAM

## 2020-01-25 PROCEDURE — 85025 COMPLETE CBC W/AUTO DIFF WBC: CPT

## 2020-01-25 PROCEDURE — 93306 TTE W/DOPPLER COMPLETE: CPT

## 2020-01-25 PROCEDURE — 2500000003 HC RX 250 WO HCPCS: Performed by: STUDENT IN AN ORGANIZED HEALTH CARE EDUCATION/TRAINING PROGRAM

## 2020-01-25 PROCEDURE — 80048 BASIC METABOLIC PNL TOTAL CA: CPT

## 2020-01-25 PROCEDURE — 36415 COLL VENOUS BLD VENIPUNCTURE: CPT

## 2020-01-25 RX ORDER — SERTRALINE HYDROCHLORIDE 25 MG/1
25 TABLET, FILM COATED ORAL DAILY
Qty: 5 TABLET | Refills: 0 | Status: SHIPPED | OUTPATIENT
Start: 2020-01-26 | End: 2020-02-03 | Stop reason: DRUGHIGH

## 2020-01-25 RX ORDER — CLONAZEPAM 1 MG/1
0.5 TABLET ORAL NIGHTLY
Qty: 7 TABLET | Refills: 0 | Status: SHIPPED | OUTPATIENT
Start: 2020-01-25 | End: 2020-02-03 | Stop reason: SDUPTHER

## 2020-01-25 RX ORDER — DIVALPROEX SODIUM 500 MG/1
500 TABLET, DELAYED RELEASE ORAL 2 TIMES DAILY
Qty: 90 TABLET | Refills: 1 | Status: SHIPPED | OUTPATIENT
Start: 2020-01-25 | End: 2020-04-09 | Stop reason: SDUPTHER

## 2020-01-25 RX ADMIN — HEPARIN SODIUM 5000 UNITS: 5000 INJECTION INTRAVENOUS; SUBCUTANEOUS at 03:11

## 2020-01-25 RX ADMIN — BUPRENORPHINE AND NALOXONE 1 TABLET: 8; 2 TABLET SUBLINGUAL at 08:30

## 2020-01-25 RX ADMIN — SERTRALINE 25 MG: 25 TABLET, FILM COATED ORAL at 08:31

## 2020-01-25 RX ADMIN — VALPROATE SODIUM 500 MG: 100 INJECTION, SOLUTION INTRAVENOUS at 03:10

## 2020-01-25 RX ADMIN — VALPROATE SODIUM 500 MG: 100 INJECTION, SOLUTION INTRAVENOUS at 16:44

## 2020-01-25 RX ADMIN — ONDANSETRON 4 MG: 2 INJECTION INTRAMUSCULAR; INTRAVENOUS at 10:41

## 2020-01-25 ASSESSMENT — PAIN SCALES - GENERAL
PAINLEVEL_OUTOF10: 3
PAINLEVEL_OUTOF10: 0

## 2020-01-25 NOTE — PROGRESS NOTES
Daily Progress Note  Neuro Critical Care    Patient Name: Reshma Whitman  Patient : 1985  Room/Bed: 0776/0890-02  Code Status: Full  Allergies: Allergies   Allergen Reactions    Flexeril [Cyclobenzaprine] Other (See Comments)     Restless legs, very axious    Amoxicillin Nausea And Vomiting    Penicillins Nausea And Vomiting       CHIEF COMPLAINT:      Seizure     INTERVAL HISTORY    Initial Presentation (Admitted 20): The patient is a 37 yo male with history of seizures and substance abuse who presented as a transfer from [facility] for status epilepticus. Patient has followed with [facility] outpatient, attempted several PO AEDs but did not tolerate them for various reasons. His current regimen per report was vimpat 100 mg BID and xanax. He was intubated at the outlying facility. CT head was negative for acute findings. Hospital Course:   : EEG without seizures, continue Vimpat 100 mg BID. Extubated to room air without difficulty. DOMENICA - fluid boluses followed by high rate maintenance fluids. : tx to stepdown. In the evening had an episode of asymptomatic bradycardia in the 40s. Patient started recently on vimpat. : returned to NICU for bradycardia. Cardiology consulted, ordered ECHO, EKG WNL, trops -ve, electrolytes WNL. Loaded with 1500 fosphenytoin overnight. Vimpat stopped. Dopamine drip started. Told nephrologist he thinks he will hurt himself, psych consulted. Endorses not taking cimpat as he had mentioned before, only took 3 days worth.  : VSS, tolerating PO diet, no seizures.          CURRENT MEDICATIONS:  SCHEDULED MEDICATIONS:   valproate sodium (DEPACON) IVPB  500 mg Intravenous Q12H    sertraline  25 mg Oral Daily    [START ON 2020] sertraline  50 mg Oral Daily    clonazePAM  1 mg Oral Nightly    buprenorphine-naloxone  1 tablet Sublingual Daily    heparin (porcine)  5,000 Units Subcutaneous 3 times per day    nicotine  1 patch 24 hours) at 1/25/2020 1810  Last data filed at 1/24/2020 1849  Gross per 24 hour   Intake --   Output 600 ml   Net -600 ml         Labs and Images reviewed with:  [x] Dr. Adalberto Henriquez. Lou    [] Dr. Dinora Felix  [] Dr. Rebecca Knapp  [] There are no new interval images to review. PHYSICAL EXAM       CONSTITUTIONAL:  Well developed, well nourished, alert and oriented x 3, in no acute distress. GCS 15. Nontoxic. No dysarthria. No aphasia. HEAD:  normocephalic, atraumatic    EYES:  PERRLA, EOMI.   ENT:  moist mucous membranes   NECK:  supple, symmetric   LUNGS:  Equal air entry bilaterally, clear   CARDIOVASCULAR:  normal s1 / s2, RRR, distal pulses intact   ABDOMEN:  Soft, no rigidity   NEUROLOGIC:  Mental Status:  A & O x3,awake             Cranial Nerves:    cranial nerves II-XII are grossly intact    Motor Exam:    Drift:  absent  Tone:  normal    Motor exam is symmetrical 5 out of 5 all extremities bilaterally    Sensory:    Touch:    Right Upper Extremity:  normal  Left Upper Extremity:  normal  Right Lower Extremity:  normal  Left Lower Extremity:  normal         DRAINS:  [x] There are no drains for Neuro Critical Care to monitor at this time. ASSESSMENT AND PLAN:       37 yo male with history of seizures, transfer from [facility] in Status epilepticus due to noncompliance. NEUROLOGIC:  - CT head unremarkable  - EEG: moderate to severe diffuse encephalopathy, no seizures. - Xanax 0.5 mg BID prn  - Recommend neurosurgery consult to evaluate arachnoid cyst  - Neuro checks per protocol  - loaded with 1500 Depacon for bradycardia   - Hold vimpat for concerns of SE.   - psych consult for SI - recommending zoloft and atarax PRN  - BZP withdrawal - 1 mg Ativan x1, 1 mg Klonopin qHS    CARDIOVASCULAR:  - Goal normotension  - Continue telemetry  - Cardiology following, recommending ECHO and Holter for 2 weeks with o/p follow up.      PULMONARY:  - Extubated 1/22   - Maintaining oxygen saturations    RENAL/FLUID/ELECTROLYTE:  - Acute kidney injury  - BUN 12/ Creatinine 1.58  - Good urine output: Urine output 1.2 ml/kg/hr  - IVF: Lactated ringers @ 100 mL/hr  - Renal ultrasound unremarkable  - Nephrology consult, appreciate recs  - Replace electrolytes PRN  - Daily BMP    GI/NUTRITION:  NUTRITION:  DIET GENERAL;  Dietary Nutrition Supplements: Standard High Calorie Oral Supplement  - Bowel regimen: senna-s  - GI prophylaxis: not indicated    ID:  - afebrile overnight   - WBC 8.9  - Continue to monitor for fevers  - Daily CBC    HEME:   - H&H 12.6/37.2  - thrombocytopenia, Platelets 436  - Daily CBC    ENDOCRINE:  - Continue to monitor blood glucose, goal <180    OTHER:  - PT/OT/ST   - Code Status: Full    PROPHYLAXIS:  Stress ulcer: Not indicated    DVT PROPHYLAXIS:  - SCD sleeves - Thigh High   - Heparin 5000 units q8h    DISPOSITION:  [x] D/C    For any changes in exam or patient status please contact Neuro Critical Care.       Alicia Zuñiga MD  Neuro Critical Care  Pager [phone]  1/25/2020     6:10 PM

## 2020-01-25 NOTE — PROGRESS NOTES
Nephrology Progress Note      SUBJECTIVE       Following for DOMENICA/ATN admitted with seizures, hx of drug abuse. Cardiology consulted yesterday for bradycardia. They have signed off. Psych consulted, and felt he was not in an imminent safety concern. Creatinine down to 1.58, from 2.3. Peaked at 2.79. Urine close to 3 liters last 24 hours. Remains on IVF at 50/hr. Drinking lots of fluids. Appetite slowly improving . OBJECTIVE      CURRENT TEMPERATURE:  Temp: 98.4 °F (36.9 °C)  MAXIMUM TEMPERATURE OVER 24HRS:  Temp (24hrs), Av.3 °F (36.8 °C), Min:98 °F (36.7 °C), Max:98.8 °F (37.1 °C)    CURRENT RESPIRATORY RATE:  Resp: 15  CURRENT PULSE:  Pulse: 54  CURRENT BLOOD PRESSURE:  BP: (!) 141/88  24HR BLOOD PRESSURE RANGE:  Systolic (54BWT), OJZ:409 , Min:122 , XTC:315   ; Diastolic (57QDB), UHN:68, Min:74, Max:97    24HR INTAKE/OUTPUT:      Intake/Output Summary (Last 24 hours) at 2020 1209  Last data filed at 2020 1849  Gross per 24 hour   Intake 1579 ml   Output 2800 ml   Net -1221 ml     WEIGHT :  Patient Vitals for the past 96 hrs (Last 3 readings):   Weight   20 0600 177 lb 0.5 oz (80.3 kg)   20 1800 161 lb 6 oz (73.2 kg)     PHYSICAL EXAM      GENERAL APPEARANCE:Awake, alert, in no acute distress  SKIN: warm and dry, no rash or erythema  EYES: conjunctivae normal and sclera anicteric  ENT: no thrush no pharyngeal congestion   NECK:   No JVD. No carotid bruits and no carotid lymphadenopathy . PULMONARY: lungs are clear to auscultation. No Wheezing, no ronchi . CADRDIOVASCULAR: S1 and S2 normal NO S3 and NO S4 . No rubs , no murmur. ABDOMEN: soft nontender, bowel sounds present, no organomegaly, no ascites.        EXTREMITIES: no cyanosis, clubbing or edema     CURRENT MEDICATIONS      valproate (DEPACON) 500 mg in dextrose 5 % 100 mL IVPB, Q12H  hydrALAZINE (APRESOLINE) injection 10 mg, Q6H PRN  sertraline (ZOLOFT) tablet 25 mg, Daily  hydrOXYzine (ATARAX) tablet 25 mg, BID 01/23/2020     URINE EOSINOPHILS:   Lab Results   Component Value Date    UREO NONE SEEN 01/23/2020     URINALYSIS:  U/A:   Lab Results   Component Value Date    NITRU NEGATIVE 01/23/2020    COLORU YELLOW 01/23/2020    PHUR 5.0 01/23/2020    WBCUA 5 TO 10 01/23/2020    WBCUA OCCASIONAL 01/21/2020    RBCUA 0 TO 2 01/23/2020    MUCUS NOT REPORTED 01/23/2020    TRICHOMONAS NOT REPORTED 01/23/2020    YEAST NOT REPORTED 01/23/2020    BACTERIA NOT REPORTED 01/23/2020    SPECGRAV 1.008 01/23/2020    LEUKOCYTESUR TRACE 01/23/2020    UROBILINOGEN Normal 01/23/2020    BILIRUBINUR NEGATIVE 01/23/2020    BILIRUBINUR NEGATIVE 01/21/2020    GLUCOSEU NEGATIVE 01/23/2020    KETUA NEGATIVE 01/23/2020    AMORPHOUS NOT REPORTED 01/23/2020     Renal ultrasound is without hydronephrosis, normal size kidneys. No mention of any cysts on either kidney. Patient was concerned about this which as his father is known to have polycystic kidney disease. ASSESSMENT      1. DOMENICA : Most likely secondary to mild ATN which seems to be improving now. 2. HTN: Stable  3. Status epilepticus  4. History of drug use  5. Asymptomatic Bradycardia: Improved    PLAN      1. Discontinue IVF  2. BMP in am  3. Discharge planning  4. Expect full recovery of renal function  5. Repeat Labs in 2 weeks after discharge      Please do not hesitate to call with questions. Electronically signed by AUTUMN Ding on 1/25/2020 at 12:09 PM  Attending Physician Statement  I have discussed the care of Radha Simmons, including pertinent history and exam findings with the resident/fellow. I have reviewed the key elements of all parts of the encounter with the resident/fellow. I have seen and examined the patient with the resident/fellow. I agree with the assessment and plan and status of the problem list as documented. We will sign off. Please call if needed   .   Read MD Kassidy

## 2020-01-25 NOTE — PLAN OF CARE
Problem: Falls - Risk of:  Goal: Will remain free from falls  Description  Will remain free from falls  1/25/2020 1011 by Nathalia Nelson RN  Outcome: Ongoing  1/25/2020 0500 by Lauren Farnsworth RN  Outcome: Met This Shift  Goal: Absence of physical injury  Description  Absence of physical injury  1/25/2020 1011 by Nathalia Nelson RN  Outcome: Ongoing  1/25/2020 0500 by Lauren Farnsworth RN  Outcome: Met This Shift     Problem: Skin Integrity:  Goal: Will show no infection signs and symptoms  Description  Will show no infection signs and symptoms  1/25/2020 1011 by Nathalia Nelson RN  Outcome: Ongoing  1/25/2020 0500 by Lauren Farnsworth RN  Outcome: Met This Shift  Goal: Absence of new skin breakdown  Description  Absence of new skin breakdown  1/25/2020 1011 by Nathalia Nelson RN  Outcome: Ongoing  1/25/2020 0500 by Lauren Farnsworth RN  Outcome: Met This Shift     Problem: Nutrition  Goal: Optimal nutrition therapy  1/25/2020 1011 by Nathalia Nelson RN  Outcome: Ongoing  1/25/2020 0500 by Lauren Farnsworth RN  Outcome: Met This Shift     Problem: Risk for Impaired Skin Integrity  Goal: Tissue integrity - skin and mucous membranes  Description  Structural intactness and normal physiological function of skin and  mucous membranes.   1/25/2020 1011 by Nathalia Nelson RN  Outcome: Ongoing  1/25/2020 0500 by Lauren Farnsworth RN  Outcome: Met This Shift     Problem: Pain:  Goal: Pain level will decrease  Description  Pain level will decrease  1/25/2020 1011 by Nathalia Nelson RN  Outcome: Ongoing  1/25/2020 0500 by Lauren Farnsworth RN  Outcome: Met This Shift  Goal: Control of acute pain  Description  Control of acute pain  1/25/2020 1011 by Nathalia Nelson RN  Outcome: Ongoing  1/25/2020 0500 by Lauren Farnsworth RN  Outcome: Met This Shift  Goal: Control of chronic pain  Description  Control of chronic pain  1/25/2020 1011 by Nathalia Nelson RN  Outcome: Ongoing  1/25/2020 0500 by Lauren Farnsworth RN  Outcome: Met This Shift

## 2020-01-25 NOTE — DISCHARGE INSTR - DIET

## 2020-01-25 NOTE — DISCHARGE SUMMARY
12   CREATININE 2.79*  --  2.38* 1.58*       Ct Head Wo Contrast    Result Date: 2020  Ordering Provider: Jennifer  Name:   Rafael Morales         : 1985  Unit #:   [id number]         Age:  36  Account #:     [de-identified]      Attending Physician:     Pt Location:   ER   Date of Service:   20    CT HEAD W/O CONTRAST    HISTORY:  Altered mental status. Unresponsive. Seizure     Study:  Head CT without contrast dated 2020.  1330 hours     TECHNIQUE: Axial images were obtained through the brain without IV  contrast. Images were viewed in soft tissue and bone windows. Individualized  dose optimization techniques were used for the CT scan. Findings:  The ventricles and cisternal spaces are normal in size and  configuration without midline shift or mass effect. There is no evidence of intracranial hemorrhage or  hematoma. Benign sublenticular cyst is present on the left. No acute  parenchymal abnormalities. IMPRESSION:    No evidence of acute brain abnormality. The emergency department was notified at the time of dictation. EXAM/ORDER VERIFICATION: Y  PT/FAMILY VERBALIZES UNDERSTANDING OF EXAM Y  PT SHIELDED N  Is pt pregnant? LMP   TECH INITIALS KZ               COMMENTS       Electronically Signed by:  Adis Pena M.D.  20 [facility].Ashish  ________________________________________  Adis Pena M.D. Date Dict:  20 1403 Adis Pena M.D. Date Trans: 20 1403 [facility]    [id number]    cc: Wade Gomez MD-ER      Us Renal Complete    Result Date: 2020  EXAMINATION: RETROPERITONEAL ULTRASOUND OF THE KIDNEYS AND URINARY BLADDER 2020 COMPARISON: None HISTORY: ORDERING SYSTEM PROVIDED HISTORY: DOMENICA TECHNOLOGIST PROVIDED HISTORY: DOMENICA FINDINGS: Kidneys: The right kidney measures 11.4 cm in length and the left kidney measures 12.0 cm in length.  Kidneys demonstrate normal cortical echogenicity and thickness. No evidence of hydronephrosis or intrarenal stones. Bladder: Urinary bladder was not visualized due to nondistention. 1.  Unremarkable sonographic appearance of the kidneys. 2.  Nondistended urinary bladder. Xr Chest Portable    Result Date: 2020  EXAMINATION: ONE XRAY VIEW OF THE CHEST 2020 8:50 pm COMPARISON: 2020 HISTORY: ORDERING SYSTEM PROVIDED HISTORY: OG tube placement/ET tube placement verification TECHNOLOGIST PROVIDED HISTORY: OG tube placement/ET tube placement verification Reason for Exam: upright FINDINGS: ET tube 54 mm above the jennifer. Enteric tube is in the stomach. The lungs are without acute focal process. There is no effusion or pneumothorax. The cardiomediastinal silhouette is without acute process. The osseous structures are without acute process. No acute process. Satisfactory repositioning of the endotracheal and enteric tubes. Xr Chest Portable    Result Date: 2020  EXAMINATION: ONE XRAY VIEW OF THE CHEST 2020 6:40 pm COMPARISON: 2020 HISTORY: ORDERING SYSTEM PROVIDED HISTORY: seizure TECHNOLOGIST PROVIDED HISTORY: seizure Reason for Exam: upright FINDINGS: ET tube appears higher terminating 10 cm above the jennifer. New enteric tube is doubled back in the mid esophagus. Heart and mediastinum normal.  Lungs clear. Bony thorax intact. Malpositioned tubes as above. ET tube should be advanced 5-6 cm. Enteric tube should be removed and repositioned. RECOMMENDATION: The findings were sent to the Radiology Results [address] at 7:05 pm on 2020to be communicated to a licensed caregiver.      Xr Chest Portable    Result Date: 2020  Ordering Provider: Jennifer  Name:   Riri Vincent         : 1985  Unit #:   [id number]         Age:  36  Account #:     [de-identified]      Attending Physician:     Pt Location:   ER   Date of Service:   20    CHEST - PORTABLE    HISTORY:  Overdose with concern for aspiration     Study:  Portable chest dated 01/21/2020.  1333 hours     Findings:  The patient has been intubated with its tip approximately 5 cm  above the jennifer. The heart size is normal.  The lungs are clear without infiltrate or  pleural fluid. IMPRESSION:    Status post intubation in good position. No acute pulmonary process. The emergency department was notified at the time of dictation. EXAM/ORDER VERIFICATION: Y  PT/FAMILY VERBALIZES UNDERSTANDING OF EXAM Y  PT SHIELDED Y  Is pt pregnant? [facility]   TECH INITIALS KZ               COMMENTS       Electronically Signed by:  Alyx Bro M.D.  01/24/20 [place].Led  ________________________________________  Alyx Bro M.D. Date Dict:  01/21/20 Salyl Gandara M.D. Date Trans: 01/21/20 1404 [facility]    [id number]    cc: Mainor Orantes MD-ER          DISCHARGE INSTRUCTIONS     Discharge Medications:        Medication List      START taking these medications    clonazePAM 1 MG tablet  Commonly known as:  KLONOPIN  Take 0.5 tablets by mouth nightly for 14 days. divalproex 500 MG DR tablet  Commonly known as:  Depakote  Take 1 tablet by mouth 2 times daily     * sertraline 25 MG tablet  Commonly known as:  ZOLOFT  Take 1 tablet by mouth daily for 5 days  Start taking on:  January 26, 2020     * sertraline 50 MG tablet  Commonly known as:  ZOLOFT  Take 1 tablet by mouth daily  Start taking on:  February 1, 2020         * This list has 2 medication(s) that are the same as other medications prescribed for you. Read the directions carefully, and ask your doctor or other care provider to review them with you.             CONTINUE taking these medications    Suboxone 8-2 MG Film SL film  Generic drug:  buprenorphine-naloxone        STOP taking these medications    lacosamide 50 MG Tabs tablet  Commonly known as:  Vimpat     topiramate 25 MG tablet  Commonly known as:

## 2020-02-03 ENCOUNTER — TELEPHONE (OUTPATIENT)
Dept: FAMILY MEDICINE CLINIC | Age: 35
End: 2020-02-03

## 2020-02-03 ENCOUNTER — OFFICE VISIT (OUTPATIENT)
Dept: FAMILY MEDICINE CLINIC | Age: 35
End: 2020-02-03
Payer: MEDICARE

## 2020-02-03 VITALS
WEIGHT: 169 LBS | SYSTOLIC BLOOD PRESSURE: 120 MMHG | HEIGHT: 72 IN | OXYGEN SATURATION: 97 % | DIASTOLIC BLOOD PRESSURE: 78 MMHG | BODY MASS INDEX: 22.89 KG/M2 | HEART RATE: 87 BPM

## 2020-02-03 PROBLEM — K21.9 GERD (GASTROESOPHAGEAL REFLUX DISEASE): Status: ACTIVE | Noted: 2020-02-03

## 2020-02-03 PROCEDURE — 99214 OFFICE O/P EST MOD 30 MIN: CPT | Performed by: FAMILY MEDICINE

## 2020-02-03 PROCEDURE — 99211 OFF/OP EST MAY X REQ PHY/QHP: CPT | Performed by: FAMILY MEDICINE

## 2020-02-03 PROCEDURE — 1111F DSCHRG MED/CURRENT MED MERGE: CPT | Performed by: FAMILY MEDICINE

## 2020-02-03 RX ORDER — RANITIDINE 150 MG/1
150 TABLET ORAL NIGHTLY
Qty: 90 TABLET | Refills: 1 | Status: SHIPPED | OUTPATIENT
Start: 2020-02-03

## 2020-02-03 RX ORDER — CLONAZEPAM 0.5 MG/1
0.5 TABLET ORAL NIGHTLY PRN
Qty: 20 TABLET | Refills: 0 | Status: SHIPPED | OUTPATIENT
Start: 2020-02-03 | End: 2020-03-18 | Stop reason: SDUPTHER

## 2020-02-03 RX ORDER — NICOTINE 21 MG/24HR
1 PATCH, TRANSDERMAL 24 HOURS TRANSDERMAL EVERY 24 HOURS
Qty: 14 PATCH | Refills: 0 | Status: SHIPPED | OUTPATIENT
Start: 2020-02-03 | End: 2020-07-28 | Stop reason: SDUPTHER

## 2020-02-03 ASSESSMENT — ENCOUNTER SYMPTOMS: SHORTNESS OF BREATH: 1

## 2020-02-03 NOTE — TELEPHONE ENCOUNTER
[facility] Trans[facility] Initial Follow Up Call    Outreach made within 2 business days of discharge: No    Patient: Amna Elmore Patient : 1985   MRN: [id number]  Reason for Admission: Acute Kidney injury, seizure  Discharge Date: 20       Spoke with: Gus Barone    Discharge department/facility: [facility]    [facility] Interactive Patient Contact:  Was patient able to fill all prescriptions: Yes  Was patient instructed to bring all medications to the follow-up visit: Yes  Is patient taking all medications as directed in the discharge summary? Yes  Does patient understand their discharge instructions: Yes  Does patient have questions or concerns that need addressed prior to 7-14 day follow up office visit: no    Scheduled appointment with PCP within 7-14 days    Follow Up  No future appointments.     Eliana Guidry MA

## 2020-03-18 ENCOUNTER — OFFICE VISIT (OUTPATIENT)
Dept: FAMILY MEDICINE CLINIC | Age: 35
End: 2020-03-18
Payer: MEDICARE

## 2020-03-18 VITALS
WEIGHT: 174 LBS | BODY MASS INDEX: 23.57 KG/M2 | DIASTOLIC BLOOD PRESSURE: 64 MMHG | HEART RATE: 77 BPM | HEIGHT: 72 IN | SYSTOLIC BLOOD PRESSURE: 98 MMHG | OXYGEN SATURATION: 97 %

## 2020-03-18 PROCEDURE — G8484 FLU IMMUNIZE NO ADMIN: HCPCS | Performed by: FAMILY MEDICINE

## 2020-03-18 PROCEDURE — 4004F PT TOBACCO SCREEN RCVD TLK: CPT | Performed by: FAMILY MEDICINE

## 2020-03-18 PROCEDURE — 99213 OFFICE O/P EST LOW 20 MIN: CPT | Performed by: FAMILY MEDICINE

## 2020-03-18 PROCEDURE — 99211 OFF/OP EST MAY X REQ PHY/QHP: CPT | Performed by: FAMILY MEDICINE

## 2020-03-18 PROCEDURE — G8420 CALC BMI NORM PARAMETERS: HCPCS | Performed by: FAMILY MEDICINE

## 2020-03-18 PROCEDURE — G8427 DOCREV CUR MEDS BY ELIG CLIN: HCPCS | Performed by: FAMILY MEDICINE

## 2020-03-18 RX ORDER — CLONAZEPAM 0.5 MG/1
0.5 TABLET ORAL NIGHTLY PRN
Qty: 20 TABLET | Refills: 0 | Status: SHIPPED | OUTPATIENT
Start: 2020-03-18 | End: 2020-05-06 | Stop reason: SDUPTHER

## 2020-03-18 ASSESSMENT — ENCOUNTER SYMPTOMS
ABDOMINAL PAIN: 0
SHORTNESS OF BREATH: 0
VOMITING: 0
NAUSEA: 0

## 2020-03-18 NOTE — PATIENT INSTRUCTIONS
Reviewed diabetic hard candy  May call 9-800-QUIT NOW to help with stopping smoking  Increase fluids except coffee, tea and alcohol. 64 oz fluids daily not including pop.

## 2020-04-09 ENCOUNTER — TELEMEDICINE (OUTPATIENT)
Dept: NEUROLOGY | Age: 35
End: 2020-04-09
Payer: MEDICARE

## 2020-04-09 PROCEDURE — G8427 DOCREV CUR MEDS BY ELIG CLIN: HCPCS | Performed by: PSYCHIATRY & NEUROLOGY

## 2020-04-09 PROCEDURE — 99214 OFFICE O/P EST MOD 30 MIN: CPT | Performed by: PSYCHIATRY & NEUROLOGY

## 2020-04-09 RX ORDER — NICOTINE 21 MG/24HR
1 PATCH, TRANSDERMAL 24 HOURS TRANSDERMAL DAILY
Qty: 30 PATCH | Refills: 1 | Status: SHIPPED | OUTPATIENT
Start: 2020-04-09 | End: 2020-07-28

## 2020-04-09 RX ORDER — DIVALPROEX SODIUM 500 MG/1
500 TABLET, DELAYED RELEASE ORAL 2 TIMES DAILY
Qty: 60 TABLET | Refills: 3 | Status: SHIPPED | OUTPATIENT
Start: 2020-04-09 | End: 2020-07-28 | Stop reason: SDUPTHER

## 2020-04-09 NOTE — PROGRESS NOTES
Alzheimers    Heart Disease Paternal Grandmother     Obesity Paternal Grandmother     Cancer Paternal Grandfather     High Blood Pressure Paternal Grandfather      Social History     Tobacco Use    Smoking status: Current Every Day Smoker     Packs/day: 0.50     Types: Cigarettes     Start date: 11/13/2005    Smokeless tobacco: Never Used   Substance Use Topics    Alcohol use: No          Subjective:     Review of Systems  done by staff reviewed and pertinent positives include  Fatigue, neck pain, back pain, memory loss, seizures, balance difficulties, headaches, anxiety, depression. Denies suicidal ideations. Objective:     Vitals: Patient checked his blood pressure yesterday and it was 122/78, temperature 98.7 °F.  Weight 176 LB; height 6 feet. Physical Exam    General appearance: he is a well-built and well-nourished male in no acute cardiorespiratory distress. HEENT:  Normocephalic and atraumatic. Neck:  Supple. Neurologic exam:  he is alert, awake, and oriented to self, place, and time. he followed one-step as well as complex commands. he  could recall major news events well. he  has good naming and good repetition. Able to spell the word backwards well. Able to do serial subtractions well. Able to read a sentence well. Able to write a sentence well. His higher intellectual functions seem to be with in normal limits. Cranial nerve exam: Pupils are round. Extraocular movements are intact. Face appears symmetric. Tongue protrudes midline. Palate elevates symmetrically. Shoulder shrug is intact bilaterally. Motor exam:  he is able to move both upper and lower extremities equally well against gravity and resistance. Muscle bulk appears normal.  No visible abnormal involuntary movements. Unable to perform deep tendon reflexes. No pronator drift noted. No atrophy noted. Sensory exam: unable to perform. Cerebellar exam:  Reveals intact finger-nose-finger testing.     Gait exam: he is able to stand up with arms crossed, able to do heel walk and toe walk. Able to walk without any visible ataxia. Imaging:  CT head 1/21/2020: No evidence of acute brain abnormality. Brain (with/without) 10/31/2019: Probable arachnoid cyst left quadrigeminal cistern. Otherwise no acute disease. EEG monitoring 1/21/2020: continuous video EEG monitoring from 1/21/2020 at 8:48 PM to 1/22/2020 at 6:16PM, which showed moderate to severe diffuse encephalopathy in the beginning but   improved to mild diffuse encephalopathy after patient was extubated at 4:45PM. No epileptiform discharges or EEG/clinical seizures were noted. Assessment & Recommendations:        Impression and Plan: Mr. Isac Amos is a 36 y.o. male with   Recent Hosp (1/21/2020) for breakthrough sz with noncompliance with AED therapy  Bradycardia with Vimpat; switched to Depakote 500 mg bid; will check free and total levels and continue the same; driving and activity restrictions discussed and he voiced understanding those instructions. Discussion about smoking cessation; agreeable for nicotine patch; script provided    Recurrent seizures; partial seizures with impaired consciousness; intermittent generalized tonic-clonic seizures; history of head injury (September 2017) with cerebral edema.,  Delayed posttraumatic seizures since fall/winter 2017  Hx of Keppra intolerance \"increased anxiety\"  Hx of Topamax intolerance    Comorbid conditions include anxiety disorder  Hx of substance abuse: Presently on Suboxone. At his request; letter given about restrictions on machinery and driving restrictions. Established Patient Visit Time: 25 minutes  Discussed use, benefit, and side effects of prescribed medications. Personally reviewed imaging with patients and all questions answered. Pt voiced understanding. Patient agreed with treatment plan. Follow up as directed above.        If you have any questions, please do not hesitate to call

## 2020-05-06 RX ORDER — CLONAZEPAM 0.5 MG/1
0.5 TABLET ORAL NIGHTLY PRN
Qty: 20 TABLET | Refills: 0 | Status: SHIPPED | OUTPATIENT
Start: 2020-05-06 | End: 2020-06-03 | Stop reason: SDUPTHER

## 2020-05-06 NOTE — TELEPHONE ENCOUNTER
Venus Begum is calling to request a refill on the following medication(s):  Requested Prescriptions     Pending Prescriptions Disp Refills    clonazePAM (KLONOPIN) 0.5 MG tablet 20 tablet 0     Sig: Take 1 tablet by mouth nightly as needed for Anxiety (severe) for up to 20 days.        Last Visit Date (If Applicable):  9/48/1627    Next Visit Date:    8/3/2020

## 2020-06-03 RX ORDER — CLONAZEPAM 0.5 MG/1
0.5 TABLET ORAL NIGHTLY PRN
Qty: 20 TABLET | Refills: 0 | Status: SHIPPED | OUTPATIENT
Start: 2020-06-03 | End: 2020-07-09 | Stop reason: SDUPTHER

## 2020-07-09 RX ORDER — CLONAZEPAM 0.5 MG/1
0.5 TABLET ORAL NIGHTLY PRN
Qty: 20 TABLET | Refills: 0 | Status: SHIPPED | OUTPATIENT
Start: 2020-07-09 | End: 2020-08-18 | Stop reason: SDUPTHER

## 2020-07-09 NOTE — TELEPHONE ENCOUNTER
Mallika Middleton is requesting a refill on the following medication(s):  Requested Prescriptions     Pending Prescriptions Disp Refills    clonazePAM (KLONOPIN) 0.5 MG tablet 20 tablet 0     Sig: Take 1 tablet by mouth nightly as needed for Anxiety (severe) for up to 20 days.        Last Visit Date (If Applicable):  9/74/3798    Next Visit Date:    8/3/2020

## 2020-07-28 ENCOUNTER — TELEPHONE (OUTPATIENT)
Dept: NEUROLOGY | Age: 35
End: 2020-07-28

## 2020-07-28 ENCOUNTER — OFFICE VISIT (OUTPATIENT)
Dept: NEUROLOGY | Age: 35
End: 2020-07-28
Payer: MEDICARE

## 2020-07-28 VITALS
WEIGHT: 174 LBS | BODY MASS INDEX: 23.57 KG/M2 | SYSTOLIC BLOOD PRESSURE: 111 MMHG | DIASTOLIC BLOOD PRESSURE: 71 MMHG | TEMPERATURE: 98.4 F | HEIGHT: 72 IN | HEART RATE: 67 BPM

## 2020-07-28 PROCEDURE — 4004F PT TOBACCO SCREEN RCVD TLK: CPT | Performed by: PSYCHIATRY & NEUROLOGY

## 2020-07-28 PROCEDURE — G8420 CALC BMI NORM PARAMETERS: HCPCS | Performed by: PSYCHIATRY & NEUROLOGY

## 2020-07-28 PROCEDURE — G8427 DOCREV CUR MEDS BY ELIG CLIN: HCPCS | Performed by: PSYCHIATRY & NEUROLOGY

## 2020-07-28 PROCEDURE — 99214 OFFICE O/P EST MOD 30 MIN: CPT | Performed by: PSYCHIATRY & NEUROLOGY

## 2020-07-28 RX ORDER — DIVALPROEX SODIUM 500 MG/1
500 TABLET, DELAYED RELEASE ORAL 2 TIMES DAILY
Qty: 60 TABLET | Refills: 3 | Status: SHIPPED | OUTPATIENT
Start: 2020-07-28

## 2020-07-28 NOTE — PROGRESS NOTES
NEUROLOGY FOLLOW-UP  Patient Name:  Aaron Alvarado  :   1985  Clinic Visit Date: 2020        REVIEW OF SYSTEMS  Constitutional Weight changes: absent, Fevers : absent, Fatigue: absent; Any recent hospitalizations:  absent.    HEENT Ears: ringing, Eyes: glasses, Visual disturbance: absent   Reespiratory Shortness of breath: present, Cough: absent   Cardivascular Chest pain: absent, Leg swelling :absent   GI Constipation: absent, Diarrhea: absent, Swallowing change: absent    Urinary frequency: absent, Urinary urgency: present, Urinary incontinence: present   Musculoskeletal Neck pain: absent, Back pain: present, Stiffness: present, Muscle pain: absent, Joint pain: present   Dermatological Hair loss: absent, Skin changes: absent   Neurological Memory loss: present, Confusion: present, Seizures: absent; Trouble walking or imbalance: present, Dizziness: absent, Weakness: absent, Numbness present, Tremor: present, Spasm: absent, Speech difficulty: absent, Headache: present, Light sensitivity: absent   Psychiatric Anxiety: present, Depression  present, Suicidal ideations absent   Hematologic Abnormal bleeding: absent, Anemia: absent, Clotting disorder: absent, Lymph gland changes: absent
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
## 2020-07-28 NOTE — TELEPHONE ENCOUNTER
He tells me that I did take him off of work; is it true. Do we have any documents regarding that. Please let me know that. I told him if depakote levels and CT head ok; since he did not have any seizures since end of January; I can give okay to drive.     Thank you.   -dr. Martell Johnson

## 2020-08-06 ENCOUNTER — TELEPHONE (OUTPATIENT)
Dept: NEUROLOGY | Age: 35
End: 2020-08-06

## 2020-08-06 NOTE — TELEPHONE ENCOUNTER
Dr. Lindon Halsted ask that I call Jennifer Lau and see if he had the CT and lab work that was ordered 7/28/2020 done. He said that he needs those reports prior to seeing him. If the testing is not done Dr. Lindon Halsted wants his appointment for today rescheduled until after they are done. A call was placed to the patient. It went to voice mail. The mailbox was full I could not leave a message.

## 2020-08-18 ENCOUNTER — OFFICE VISIT (OUTPATIENT)
Dept: FAMILY MEDICINE CLINIC | Age: 35
End: 2020-08-18
Payer: MEDICARE

## 2020-08-18 VITALS
SYSTOLIC BLOOD PRESSURE: 100 MMHG | HEART RATE: 99 BPM | WEIGHT: 174 LBS | OXYGEN SATURATION: 97 % | HEIGHT: 72 IN | BODY MASS INDEX: 23.57 KG/M2 | DIASTOLIC BLOOD PRESSURE: 70 MMHG

## 2020-08-18 PROCEDURE — 99214 OFFICE O/P EST MOD 30 MIN: CPT | Performed by: FAMILY MEDICINE

## 2020-08-18 PROCEDURE — 99211 OFF/OP EST MAY X REQ PHY/QHP: CPT | Performed by: FAMILY MEDICINE

## 2020-08-18 PROCEDURE — 4004F PT TOBACCO SCREEN RCVD TLK: CPT | Performed by: FAMILY MEDICINE

## 2020-08-18 PROCEDURE — G8427 DOCREV CUR MEDS BY ELIG CLIN: HCPCS | Performed by: FAMILY MEDICINE

## 2020-08-18 PROCEDURE — G8420 CALC BMI NORM PARAMETERS: HCPCS | Performed by: FAMILY MEDICINE

## 2020-08-18 RX ORDER — CLONAZEPAM 0.5 MG/1
0.5 TABLET ORAL NIGHTLY PRN
Qty: 20 TABLET | Refills: 0 | Status: SHIPPED | OUTPATIENT
Start: 2020-08-18 | End: 2020-09-19 | Stop reason: SDUPTHER

## 2020-08-18 RX ORDER — OMEPRAZOLE 20 MG/1
20 CAPSULE, DELAYED RELEASE ORAL DAILY PRN
Qty: 30 CAPSULE | Refills: 3 | Status: SHIPPED | OUTPATIENT
Start: 2020-08-18

## 2020-08-18 ASSESSMENT — ENCOUNTER SYMPTOMS
ABDOMINAL PAIN: 0
SHORTNESS OF BREATH: 0
NAUSEA: 0
COUGH: 0
VOMITING: 0
SINUS PAIN: 0

## 2020-08-18 NOTE — PROGRESS NOTES
Used   Substance Use Topics    Alcohol use: No      Prior to Visit Medications    Medication Sig Taking? Authorizing Provider   divalproex (DEPAKOTE) 500 MG DR tablet Take 1 tablet by mouth 2 times daily  Hay Madison MD   clonazePAM (KLONOPIN) 0.5 MG tablet Take 1 tablet by mouth nightly as needed for Anxiety (severe) for up to 20 days. Wilver Palmer MD   nicotine (NICODERM CQ) 21 MG/24HR Place 1 patch onto the skin daily  Hay Madison MD   ranitidine (ZANTAC) 150 MG tablet Take 1 tablet by mouth nightly  Patient not taking: Reported on 7/28/2020  Wilver Palmer MD   sertraline (ZOLOFT) 50 MG tablet Take 1 tablet by mouth daily  Patient not taking: Reported on 7/28/2020  Lydia Palma MD   SUBOXONE 8-2 MG FILM SL film   Historical Provider, MD     Allergies   Allergen Reactions    Flexeril [Cyclobenzaprine] Other (See Comments)     Restless legs, very axious    Amoxicillin Nausea And Vomiting    Penicillins Nausea And Vomiting       Health Maintenance   Topic Date Due    Pneumococcal 0-64 years Vaccine (1 of 1 - PPSV23) 04/13/1992    HIV screen  04/13/2001    DTaP/Tdap/Td vaccine (1 - Tdap) 04/13/2005    Flu vaccine (1) 09/01/2020    Hepatitis A vaccine  Aged Out    Hepatitis B vaccine  Aged Out    Hib vaccine  Aged Out    Meningococcal (ACWY) vaccine  Aged Out    Varicella vaccine  Discontinued       Subjective:      Review of Systems   Constitutional: Negative for activity change, appetite change and fatigue. HENT: Negative for congestion and sinus pain. Has been having heart burn taking omeprazole, which seems to help. Eyes: Negative for visual disturbance. Respiratory: Negative for cough and shortness of breath. Cardiovascular: Negative for chest pain, palpitations and leg swelling. Gastrointestinal: Negative for abdominal pain, nausea and vomiting. Genitourinary: Negative for dysuria and frequency.    Musculoskeletal: Negative for arthralgias and understanding. Reviewed health maintenance pnuemovax 23. Instructed to continue current medications, diet and exercise. Patient agreed with treatment plan. Follow up as directed.      Electronically signed by Kendrick Awda MD on 8/18/2020

## 2020-09-19 RX ORDER — CLONAZEPAM 0.5 MG/1
0.5 TABLET ORAL NIGHTLY PRN
Qty: 3 TABLET | Refills: 0 | Status: SHIPPED | OUTPATIENT
Start: 2020-09-19 | End: 2020-09-21 | Stop reason: SDUPTHER

## 2020-09-19 NOTE — TELEPHONE ENCOUNTER
Appears Dr. Carmelo Ledezma recommended discussion with Psychiatry for this medication. I will OK a few only to get through the weekend and then to PCP to review and decide further refills.

## 2020-09-21 ENCOUNTER — TELEPHONE (OUTPATIENT)
Dept: FAMILY MEDICINE CLINIC | Age: 35
End: 2020-09-21

## 2020-09-21 RX ORDER — CLONAZEPAM 0.5 MG/1
0.5 TABLET ORAL NIGHTLY PRN
Qty: 20 TABLET | Refills: 0 | Status: SHIPPED | OUTPATIENT
Start: 2020-09-21 | End: 2022-10-13

## 2020-09-21 NOTE — TELEPHONE ENCOUNTER
Pt must arrange to see psychiatry since he is not tolerating the control medications I have attempted for anxiety/depression and with his seizure history. I will give the medication this month but he MUST follow with psychiatry or I will no longer continue this medication.    Thanks

## 2021-07-08 ENCOUNTER — NURSE TRIAGE (OUTPATIENT)
Dept: OTHER | Facility: CLINIC | Age: 36
End: 2021-07-08

## 2021-07-08 NOTE — TELEPHONE ENCOUNTER
Reason for Disposition   MODERATE difficulty breathing (e.g., speaks in phrases, SOB even at rest, pulse 100-120) of new onset or worse than normal    Answer Assessment - Initial Assessment Questions  1. RESPIRATORY STATUS: \"Describe your breathing? \" (e.g., wheezing, shortness of breath, unable to speak, severe coughing)   Pt states he hears gurgling in your chest. He feels congested. He feels that his breathing is raspy. The last couple seconds sections of his breathe is raspy. He states it feels bronchitis. Pt states he gets short of breath with walking and cannot speak in long sentences. 2. ONSET: \"When did this breathing problem begin? \"      7/1      3. PATTERN \"Does the difficult breathing come and go, or has it been constant since it started? \"   Constant. 4. SEVERITY: \"How bad is your breathing? \" (e.g., mild, moderate, severe)     - MILD: No SOB at rest, mild SOB with walking, speaks normally in sentences, can lay down, no retractions, pulse < 100.     - MODERATE: SOB at rest, SOB with minimal exertion and prefers to sit, cannot lie down flat, speaks in phrases, mild retractions, audible wheezing, pulse 100-120.     - SEVERE: Very SOB at rest, speaks in single words, struggling to breathe, sitting hunched forward, retractions, pulse > 120       Mild to Moderate. 5. RECURRENT SYMPTOM: \"Have you had difficulty breathing before? \" If so, ask: \"When was the last time? \" and \"What happened that time? \"   Yes, he states this feels similar to when he had bronchitis and pneumonia. He states he gets prescribed an antibiotic and prednisone. 6. CARDIAC HISTORY: \"Do you have any history of heart disease? \" (e.g., heart attack, angina, bypass surgery, angioplasty)   Denies. 7. LUNG HISTORY: \"Do you have any history of lung disease? \"  (e.g., pulmonary embolus, asthma, emphysema)  Denies. 8. CAUSE: \"What do you think is causing the breathing problem? \"   Sinus, weather change.         9. OTHER SYMPTOMS: \"Do you have any other symptoms? (e.g., dizziness, runny nose, cough, chest pain, fever)      Congestion, cough, runny nose, chest pressure that is constant, and chest pain that comes with cough. 10. PREGNANCY: \"Is there any chance you are pregnant? \" \"When was your last menstrual period? \"        N/A    11. TRAVEL: \"Have you traveled out of the country in the last month? \" (e.g., travel history, exposures)        Denies. Protocols used: BREATHING DIFFICULTY-ADULT-[place]    Received call from [facility] with [employer]. Brief description of triage: Breathing difficulty with chest pain that comes with a cough, occasional SOB at rest.    Triage indicates for patient to go to the ER now. If symptoms become worse, please call 911. Care advice provided, patient verbalizes understanding; denies any other questions or concerns; instructed to call back for any new or worsening symptoms. Attention Provider: Thank you for allowing me to participate in the care of your patient. The patient was connected to triage in response to information provided to the [facility]. Please do not respond through this encounter as the response is not directed to a shared pool.

## 2022-07-13 ENCOUNTER — OFFICE VISIT (OUTPATIENT)
Dept: NEUROLOGY | Age: 37
End: 2022-07-13
Payer: COMMERCIAL

## 2022-07-13 VITALS
HEIGHT: 72 IN | BODY MASS INDEX: 23.95 KG/M2 | DIASTOLIC BLOOD PRESSURE: 83 MMHG | WEIGHT: 176.8 LBS | HEART RATE: 69 BPM | SYSTOLIC BLOOD PRESSURE: 139 MMHG

## 2022-07-13 DIAGNOSIS — G40.909 SEIZURE DISORDER (HCC): ICD-10-CM

## 2022-07-13 DIAGNOSIS — Z87.828 HISTORY OF HEAD INJURY: ICD-10-CM

## 2022-07-13 DIAGNOSIS — G43.019 INTRACTABLE MIGRAINE WITHOUT AURA AND WITHOUT STATUS MIGRAINOSUS: ICD-10-CM

## 2022-07-13 DIAGNOSIS — G93.0 ARACHNOID CYST: Primary | ICD-10-CM

## 2022-07-13 PROBLEM — R56.9 PARTIAL SEIZURE WITH IMPAIRED CONSCIOUSNESS (HCC): Status: RESOLVED | Noted: 2019-10-23 | Resolved: 2022-07-13

## 2022-07-13 PROBLEM — G40.901 STATUS EPILEPTICUS (HCC): Status: RESOLVED | Noted: 2020-01-21 | Resolved: 2022-07-13

## 2022-07-13 PROBLEM — R41.89 PARTIAL SEIZURE WITH IMPAIRED CONSCIOUSNESS (HCC): Status: RESOLVED | Noted: 2019-10-23 | Resolved: 2022-07-13

## 2022-07-13 PROBLEM — G40.309 GENERALIZED SEIZURE DISORDER (HCC): Status: RESOLVED | Noted: 2019-10-23 | Resolved: 2022-07-13

## 2022-07-13 PROCEDURE — 99214 OFFICE O/P EST MOD 30 MIN: CPT | Performed by: NURSE PRACTITIONER

## 2022-07-13 PROCEDURE — 4004F PT TOBACCO SCREEN RCVD TLK: CPT | Performed by: NURSE PRACTITIONER

## 2022-07-13 PROCEDURE — G8427 DOCREV CUR MEDS BY ELIG CLIN: HCPCS | Performed by: NURSE PRACTITIONER

## 2022-07-13 PROCEDURE — G8420 CALC BMI NORM PARAMETERS: HCPCS | Performed by: NURSE PRACTITIONER

## 2022-07-13 RX ORDER — TOPIRAMATE 25 MG/1
100 TABLET ORAL 2 TIMES DAILY
Qty: 240 TABLET | Refills: 3 | Status: SHIPPED | OUTPATIENT
Start: 2022-07-13 | End: 2022-10-13 | Stop reason: ALTCHOICE

## 2022-07-13 RX ORDER — CLINDAMYCIN HYDROCHLORIDE 150 MG/1
CAPSULE ORAL
COMMUNITY
Start: 2022-05-07 | End: 2022-07-13
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
## 2022-07-18 ENCOUNTER — TELEPHONE (OUTPATIENT)
Dept: NEUROLOGY | Age: 37
End: 2022-07-18

## 2022-07-18 NOTE — TELEPHONE ENCOUNTER
Richard Chester called the office today requesting a letter for child support. Patient states that letter needs to state that he can't drive and is on light duty. When I asked him what light duty meant he stated that this was something Dr. India Rader said in the past.  Sebas Orta stated that neither of his jobs would have light duty so he would actually be off work completely. 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 Please advise.

## 2022-07-18 NOTE — LETTER
[facility]  [address] [place] [address]  Phone: [phone]  Fax: [phone]    RENU Patricia CNP        July 19, 2022     Patient: Liana Linares   YOB: 1985   Date of Visit: 7/18/2022       To Whom It May Concern:    Jarrod Murillo was seen in the office on 7/18/2022. He is advised not to work on heavy machinery and he needs to be seizure free for 6 months before he is able to drive. If you have any questions or concerns, please don't hesitate to call.     Sincerely,        RENU Patricia CNP

## 2022-07-19 NOTE — TELEPHONE ENCOUNTER
I spoke with Madiha Mercado directly about the letter. She stated that she actually was talking about the 11/13/19 note not the 5/26/20 note. New note generated today and signed by Madiha Mercado. Call placed to the patient and he will  from the office tomorrow morning. Patient informed that it would be at the .

## 2022-10-13 ENCOUNTER — OFFICE VISIT (OUTPATIENT)
Dept: NEUROLOGY | Age: 37
End: 2022-10-13
Payer: COMMERCIAL

## 2022-10-13 VITALS
BODY MASS INDEX: 24.92 KG/M2 | WEIGHT: 184 LBS | DIASTOLIC BLOOD PRESSURE: 80 MMHG | SYSTOLIC BLOOD PRESSURE: 122 MMHG | HEIGHT: 72 IN

## 2022-10-13 DIAGNOSIS — G40.909 SEIZURE DISORDER (HCC): ICD-10-CM

## 2022-10-13 DIAGNOSIS — Z87.828 HISTORY OF HEAD INJURY: Primary | ICD-10-CM

## 2022-10-13 DIAGNOSIS — G43.019 INTRACTABLE MIGRAINE WITHOUT AURA AND WITHOUT STATUS MIGRAINOSUS: ICD-10-CM

## 2022-10-13 PROCEDURE — 4004F PT TOBACCO SCREEN RCVD TLK: CPT | Performed by: NURSE PRACTITIONER

## 2022-10-13 PROCEDURE — 99214 OFFICE O/P EST MOD 30 MIN: CPT | Performed by: NURSE PRACTITIONER

## 2022-10-13 PROCEDURE — G8420 CALC BMI NORM PARAMETERS: HCPCS | Performed by: NURSE PRACTITIONER

## 2022-10-13 PROCEDURE — G8427 DOCREV CUR MEDS BY ELIG CLIN: HCPCS | Performed by: NURSE PRACTITIONER

## 2022-10-13 PROCEDURE — G8484 FLU IMMUNIZE NO ADMIN: HCPCS | Performed by: NURSE PRACTITIONER

## 2022-10-13 RX ORDER — TOPIRAMATE 50 MG/1
150 TABLET, FILM COATED ORAL 2 TIMES DAILY
Qty: 180 TABLET | Refills: 5 | Status: SHIPPED | OUTPATIENT
Start: 2022-10-13

## 2022-10-13 NOTE — LETTER
[facility]  [address] [place] [address]  Phone: [phone]  Fax: [phone], APRN - CNP        October 13, 2022     Patient: Raissa Born   YOB: 1985   Date of Visit: 10/13/2022       To Whom It May Concern: It is my medical opinion that Hemant Gonzalez is unable to drive or operate heavy machinery. The patient has been under the care of [facility] since 2019 and has been treated for a seizure disorder. The patient's seizures have been refractory at this time. He has been advised not to drive until he has been under adequate medical care and seizure-free for 6 months. This has made the patient be unable to work at his current job. He therefore, has been unable to make his child support payments. Deployment, however, he is unable to drive his car due to his current restrictions. If you have any questions or concerns, please don't hesitate to call.     Sincerely,        RENU Sapp - CNP

## 2022-10-13 NOTE — LETTER
[facility]  [address] [facility] [address]  Phone: [phone]  Fax: Becca Eun[address] [phone], APRN - CNP        October 13, 2022     Patient: Adelaide Kelsey   YOB: 1985   Date of Visit: 10/13/2022       To Whom It May Concern: It is my medical opinion that Mona Reeder is unable to drive or operate heavy machinery. The patient has been under the care of [facility] since 2019 and has been treated for seizure disorder since that time. The patient's seizures have been refractory. He has been advised not to drive until he has been under adequate medical care and seizure-free for 6 months. This has made the patient unable to work at his current job. He therefore has been unable to make his child support payments. He is actively seeking employment, however, he is unable to drive his car due to his current restrictions. If you have any questions or concerns, please don't hesitate to call.     Sincerely,        Linda Mckeon, RENU - CNP
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
## 2023-01-19 ENCOUNTER — TELEPHONE (OUTPATIENT)
Dept: NEUROLOGY | Age: 38
End: 2023-01-19

## 2023-01-20 NOTE — TELEPHONE ENCOUNTER
Form is being worked on. [id number] [facility] is requesting a letter in regards to pt's medical condition. Pt has informed them that he is unable to work.